# Patient Record
Sex: FEMALE | Race: WHITE | NOT HISPANIC OR LATINO | Employment: FULL TIME | URBAN - METROPOLITAN AREA
[De-identification: names, ages, dates, MRNs, and addresses within clinical notes are randomized per-mention and may not be internally consistent; named-entity substitution may affect disease eponyms.]

---

## 2017-01-04 ENCOUNTER — OFFICE VISIT (OUTPATIENT)
Dept: FAMILY MEDICINE | Facility: CLINIC | Age: 42
End: 2017-01-04
Payer: COMMERCIAL

## 2017-01-04 VITALS
SYSTOLIC BLOOD PRESSURE: 126 MMHG | BODY MASS INDEX: 22.76 KG/M2 | WEIGHT: 145 LBS | HEIGHT: 67 IN | HEART RATE: 80 BPM | TEMPERATURE: 97.6 F | DIASTOLIC BLOOD PRESSURE: 80 MMHG

## 2017-01-04 DIAGNOSIS — R63.4 LOSS OF WEIGHT: ICD-10-CM

## 2017-01-04 DIAGNOSIS — Z00.01 ENCOUNTER FOR GENERAL ADULT MEDICAL EXAMINATION WITH ABNORMAL FINDINGS: Primary | ICD-10-CM

## 2017-01-04 DIAGNOSIS — N95.1 SYMPTOMATIC MENOPAUSAL OR FEMALE CLIMACTERIC STATES: ICD-10-CM

## 2017-01-04 LAB
CHOLEST SERPL-MCNC: 202 MG/DL
GLUCOSE SERPL-MCNC: 87 MG/DL (ref 70–99)
HDLC SERPL-MCNC: 74 MG/DL
LDLC SERPL CALC-MCNC: 111 MG/DL
NONHDLC SERPL-MCNC: 128 MG/DL
TRIGL SERPL-MCNC: 85 MG/DL
TSH SERPL DL<=0.005 MIU/L-ACNC: 0.94 MU/L (ref 0.4–4)

## 2017-01-04 PROCEDURE — 99000 SPECIMEN HANDLING OFFICE-LAB: CPT | Performed by: NURSE PRACTITIONER

## 2017-01-04 PROCEDURE — 87624 HPV HI-RISK TYP POOLED RSLT: CPT | Mod: 90 | Performed by: NURSE PRACTITIONER

## 2017-01-04 PROCEDURE — 84443 ASSAY THYROID STIM HORMONE: CPT | Mod: 90 | Performed by: NURSE PRACTITIONER

## 2017-01-04 PROCEDURE — 82947 ASSAY GLUCOSE BLOOD QUANT: CPT | Mod: 90 | Performed by: NURSE PRACTITIONER

## 2017-01-04 PROCEDURE — G0145 SCR C/V CYTO,THINLAYER,RESCR: HCPCS | Mod: 90 | Performed by: NURSE PRACTITIONER

## 2017-01-04 PROCEDURE — 99396 PREV VISIT EST AGE 40-64: CPT | Performed by: NURSE PRACTITIONER

## 2017-01-04 PROCEDURE — 80061 LIPID PANEL: CPT | Mod: 90 | Performed by: NURSE PRACTITIONER

## 2017-01-04 PROCEDURE — 36415 COLL VENOUS BLD VENIPUNCTURE: CPT | Performed by: NURSE PRACTITIONER

## 2017-01-04 RX ORDER — DESOGESTREL AND ETHINYL ESTRADIOL 0.15-0.03
1 KIT ORAL DAILY
Qty: 84 TABLET | Refills: 4 | Status: SHIPPED | OUTPATIENT
Start: 2017-01-04 | End: 2017-06-19

## 2017-01-04 NOTE — MR AVS SNAPSHOT
After Visit Summary   1/4/2017    Nilam Orozco    MRN: [id number]           Patient Information     Date Of Birth          1975        Visit Information        Provider Department      1/4/2017 8:00 AM Rashida Talley APRN [facility]        Today's Diagnoses     Encounter for general adult medical examination with abnormal findings    -  1     Loss of weight           Care Instructions      Preventive Health Recommendations  Female Ages 40 to 49    Yearly exam:     See your health care provider every year in order to  1. Review health changes.   2. Discuss preventive care.    3. Review your medicines if your doctor prescribed any.      Get a Pap test every three years (unless you have an abnormal result and your provider advises testing more often).      If you get Pap tests with HPV test, you only need to test every 5 years, unless you have an abnormal result. You do not need a Pap test if your uterus was removed (hysterectomy) and you have not had cancer.      You should be tested each year for STDs (sexually transmitted diseases), if you're at risk.       Ask your doctor if you should have a mammogram.      Have a colonoscopy (test for colon cancer) if someone in your family has had colon cancer or polyps before age 50.       Have a cholesterol test every 5 years.       Have a diabetes test (fasting glucose) after age 45. If you are at risk for diabetes, you should have this test every 3 years.    Shots: Get a flu shot each year. Get a tetanus shot every 10 years.     Nutrition:     Eat at least 5 servings of fruits and vegetables each day.    Eat whole-grain bread, whole-wheat pasta and brown rice instead of white grains and rice.    Talk to your provider about Calcium and Vitamin D.     Lifestyle    Exercise at least 150 minutes a week (an average of 30 minutes a day, 5 days a week). This will help you control your weight and prevent disease.    Limit alcohol to one  "drink per day.    No smoking.     Wear sunscreen to prevent skin cancer.    See your dentist every six months for an exam and cleaning.        Follow-ups after your visit        Who to contact     If you have questions or need follow up information about today's clinic visit or your schedule please contact [facility] directly at [phone].  Normal or non-critical lab and imaging results will be communicated to you by MyChart, letter or phone within 4 business days after the clinic has received the results. If you do not hear from us within 7 days, please contact the clinic through CoverMyMedshart or phone. If you have a critical or abnormal lab result, we will notify you by phone as soon as possible.  Submit refill requests through i[employer] or call your pharmacy and they will forward the refill request to us. Please allow 3 business days for your refill to be completed.          Additional Information About Your Visit        CoverMyMedshart Information     i[employer] lets you send messages to your doctor, view your test results, renew your prescriptions, schedule appointments and more. To sign up, go to [email or web address] . Click on \"Log in\" on the left side of the screen, which will take you to the Welcome page. Then click on \"Sign up Now\" on the right side of the page.     You will be asked to enter the access code listed below, as well as some personal information. Please follow the directions to create your username and password.     Your access code is: [email or web address]  Expires: 2017  9:19 AM     Your access code will  in 90 days. If you need help or a new code, please call your [place] clinic or [phone].        Care EveryWhere ID     This is your Care EveryWhere ID. This could be used by other organizations to access your [place] medical records  [id number]        Your Vitals Were     Pulse Temperature Height BMI (Body Mass Index) Last Period       80 97.6  F (36.4  C) (Tympanic) 5' 7\" " (1.702 m) 22.71 kg/m2 06/01/2016        Blood Pressure from Last 3 Encounters:   01/04/17 126/80   08/06/14 120/80    Weight from Last 3 Encounters:   01/04/17 145 lb (65.772 kg)   08/06/14 145 lb 3.2 oz (65.862 kg)              We Performed the Following     Glucose     HPV High Risk Types DNA Cervical     Lipid panel reflex to direct LDL     Pap imaged thin layer screen with HPV - recommended age 30 - 65 years (select HPV order below)     TSH with free T4 reflex        Primary Care Provider    None Specified       No primary provider on file.        Thank you!     Thank you for choosing [facility]  for your care. Our goal is always to provide you with excellent care. Hearing back from our patients is one way we can continue to improve our services. Please take a few minutes to complete the written survey that you may receive in the mail after your visit with us. Thank you!             Your Updated Medication List - Protect others around you: Learn how to safely use, store and throw away your medicines at www.disposemymeds.org.      Notice  As of 1/4/2017  9:19 AM    You have not been prescribed any medications.

## 2017-01-04 NOTE — PROGRESS NOTES
.     SUBJECTIVE:     CC: Nilam Orozco is an 41 year old woman who presents for preventive health visit.     Healthy Habits:    Do you get at least three servings of calcium containing foods daily (dairy, green leafy vegetables, etc.)? yes    Amount of exercise or daily activities, outside of work: on feet all day with working    Problems taking medications regularly not applicable    Medication side effects: No    Have you had an eye exam in the past two years? no    Do you see a dentist twice per year? yes    Do you have sleep apnea, excessive snoring or daytime drowsiness?no            Today's PHQ-2 Score: No flowsheet data found.    Abuse: Current or Past(Physical, Sexual or Emotional)- No  Do you feel safe in your environment - Yes    Social History   Substance Use Topics     Smoking status: Never Smoker      Smokeless tobacco: Not on file     Alcohol Use: 0.0 oz/week     0 Standard drinks or equivalent per week     The patient does not drink >3 drinks per day nor >7 drinks per week.    Recent Labs   Lab Test 12   CHOL  180   HDL  48   LDL  104   TRIG  140       Reviewed orders with patient.  Reviewed health maintenance and updated orders accordingly - Yes    Mammo Decision Support:  Patient under age 50, mutual decision reflected in health maintenance.      Pertinent mammograms are reviewed under the imaging tab.  History of abnormal Pap smear: NO - age 30-65 PAP every 5 years with negative HPV co-testing recommended  All Histories reviewed and updated in Epic.  Past Medical History   Diagnosis Date     Lipoma      multiple on legs      Past Surgical History   Procedure Laterality Date     Surgical pathology exam       multiple     Obstetric History       T0      TAB0   SAB0   E0   M0   L3       # Outcome Date GA Lbr Heam/2nd Weight Sex Delivery Anes PTL Lv   3             2             1                    ROS:  C: POSITIVE for weight loss of approximately 45 pounds  "after starting a new job. His very physical, she is on her feet walking all day long. This weight was lost over the course of about 6 months. She has gained some of it back and has now maintained that weight for the past year   I: NEGATIVE for worrisome rashes, moles or lesions  E: NEGATIVE for vision changes or irritation  ENT: NEGATIVE for ear, mouth and throat problems  R: NEGATIVE for significant cough or SOB  B: NEGATIVE for masses, tenderness or discharge  CV: NEGATIVE for chest pain, palpitations or peripheral edema  GI: NEGATIVE for nausea, abdominal pain, heartburn, or change in bowel habits   female: POSITIVE for infrequent irregular menstrual periods. States she has her menses approximately once every 6 months for the past 3 years  M: NEGATIVE for significant arthralgias or myalgia  N: NEGATIVE for weakness, dizziness or paresthesias  ENDOCRINE: POSITIVE  for hot flashes and diminished libido  P: NEGATIVE for changes in mood or affect    Problem list, Medication list, Allergies, and Medical/Social/Surgical histories reviewed in [facility] and updated as appropriate.  BP Readings from Last 3 Encounters:   01/04/17 126/80   08/06/14 120/80    Wt Readings from Last 3 Encounters:   01/04/17 145 lb (65.772 kg)   08/06/14 145 lb 3.2 oz (65.862 kg)                  OBJECTIVE:     /80 mmHg  Pulse 80  Temp(Src) 97.6  F (36.4  C) (Tympanic)  Ht 5' 7\" (1.702 m)  Wt 145 lb (65.772 kg)  BMI 22.71 kg/m2  LMP 06/01/2016  EXAM:  GENERAL: healthy, alert and no distress  EYES: Eyes grossly normal to inspection, PERRL and conjunctivae and sclerae normal  HENT: ear canals and TM's normal, nose and mouth without ulcers or lesions  NECK: no adenopathy, no asymmetry, masses, or scars and thyroid normal to palpation  RESP: lungs clear to auscultation - no rales, rhonchi or wheezes  BREAST: normal without masses, tenderness or nipple discharge and no palpable axillary masses or adenopathy  CV: regular rate and rhythm, " normal S1 S2, no S3 or S4, no murmur, click or rub, no peripheral edema and peripheral pulses strong  ABDOMEN: soft, nontender, no hepatosplenomegaly, no masses and bowel sounds normal   (female): normal female external genitalia, normal urethral meatus, vaginal mucosa pink, moist, well rugated, and normal cervix/adnexa/uterus without masses or discharge  MS: no gross musculoskeletal defects noted, no edema  SKIN: no suspicious lesions or rashes  NEURO: Normal strength and tone, mentation intact and speech normal  PSYCH: mentation appears normal, affect normal/bright  LYMPH: no cervical, supraclavicular, axillary, or inguinal adenopathy    ASSESSMENT/PLAN:     1. Encounter for general adult medical examination with abnormal findings  Recommend annual well exam with Pap smear and HPV testing every 5 years if negative  - Pap imaged thin layer screen with HPV - recommended age 30 - 65 years (select HPV order below)  - HPV High Risk Types DNA Cervical  - Lipid panel reflex to direct LDL  - Glucose    2. Loss of weight  Possibly related to increased activity and poor eating habits. Will rule out thyroid disorder  - TSH with free T4 reflex    3. Symptomatic menopausal or female climacteric states  Patient is 41, a nonsmoker. Expensive symptoms of menopause. Will put on oral contraceptive for estrogen replacement. Potential side effects reviewed with patient.  - desogestrel-ethinyl estradiol (APRI) 0.15-30 MG-MCG per tablet; Take 1 tablet by mouth daily  Dispense: 84 tablet; Refill: 4    COUNSELING:   Reviewed preventive health counseling, as reflected in patient instructions       Regular exercise       Healthy diet/nutrition       Osteoporosis Prevention/Bone Health       (Ana)menopause management    BP Screening:   Last 3 BP Readings:    BP Readings from Last 3 Encounters:   01/04/17 126/80   08/06/14 120/80       The following was recommended to the patient:  Re-screen BP within a year and recommended lifestyle  "modifications       reports that she has never smoked. She does not have any smokeless tobacco history on file.    Estimated body mass index is 22.71 kg/(m^2) as calculated from the following:    Height as of this encounter: 5' 7\" (1.702 m).    Weight as of this encounter: 145 lb (65.772 kg).       Counseling Resources:  ATP IV Guidelines  Pooled Cohorts Equation Calculator  Breast Cancer Risk Calculator  FRAX Risk Assessment  ICSI Preventive Guidelines  Dietary Guidelines for Americans, 2010  USDA's MyPlate  ASA Prophylaxis  Lung CA Screening    WINDY Suarez CNP  [facility]  "

## 2017-01-04 NOTE — Clinical Note
[address] [id number] [phone]             January 5, 2017    Nilam Orozco  [address] [facility] [address]              Dear Nilam,    blood sugar and TSH are both normal. Cholesterol actually looks quite good, just barely above the ideal. Maintain adequate exercise and nutritious diet, limit fats.  Enclosed is a copy of the results.  It was a pleasure to see you at your last appointment.    If you have any questions or concerns, please call myself or my nurse at [phone].      Sincerely,      Rashida Talley CNP / care team

## 2017-01-04 NOTE — Clinical Note
[address] [address]  [phone]      January 11, 2017    Nilam Orozco  [address] [facility] [address]    Dear Nilam,  We are happy to inform you that your PAP smear result from 1/4/17 is normal.  We are now able to do a follow up test on PAP smears. The DNA test is for HPV (Human Papilloma Virus). Cervical cancer is closely linked with certain types of HPV. Your result showed no evidence of high risk HPV.  Therefore we recommend you return in 5 years for your next pap smear.  You will still need to return to the clinic every year for an annual exam and other preventive tests.  Please contact the clinic with any questions.  Sincerely,  WINDY Suarez CNP/rlm

## 2017-01-04 NOTE — NURSING NOTE
"Chief Complaint   Patient presents with     Physical       Initial /80 mmHg  Pulse 80  Temp(Src) 97.6  F (36.4  C) (Tympanic)  Ht 5' 7\" (1.702 m)  Wt 145 lb (65.772 kg)  BMI 22.71 kg/m2  LMP 06/01/2016 Estimated body mass index is 22.71 kg/(m^2) as calculated from the following:    Height as of this encounter: 5' 7\" (1.702 m).    Weight as of this encounter: 145 lb (65.772 kg).  BP completed using cuff size: regular  "

## 2017-01-06 LAB
COPATH REPORT: NORMAL
PAP: NORMAL

## 2017-01-09 LAB
FINAL DIAGNOSIS: NORMAL
HPV HR 12 DNA CVX QL NAA+PROBE: NEGATIVE
HPV16 DNA SPEC QL NAA+PROBE: NEGATIVE
HPV18 DNA SPEC QL NAA+PROBE: NEGATIVE
SPECIMEN DESCRIPTION: NORMAL

## 2017-06-19 ENCOUNTER — OFFICE VISIT (OUTPATIENT)
Dept: FAMILY MEDICINE | Facility: OTHER | Age: 42
End: 2017-06-19
Payer: COMMERCIAL

## 2017-06-19 VITALS
RESPIRATION RATE: 12 BRPM | DIASTOLIC BLOOD PRESSURE: 60 MMHG | HEIGHT: 67 IN | BODY MASS INDEX: 23.86 KG/M2 | TEMPERATURE: 98.2 F | WEIGHT: 152 LBS | SYSTOLIC BLOOD PRESSURE: 108 MMHG | HEART RATE: 82 BPM

## 2017-06-19 DIAGNOSIS — S46.811A STRAIN OF TRAPEZIUS MUSCLE, RIGHT, INITIAL ENCOUNTER: Primary | ICD-10-CM

## 2017-06-19 DIAGNOSIS — M54.2 CERVICALGIA: ICD-10-CM

## 2017-06-19 PROCEDURE — 99213 OFFICE O/P EST LOW 20 MIN: CPT | Performed by: FAMILY MEDICINE

## 2017-06-19 ASSESSMENT — PAIN SCALES - GENERAL: PAINLEVEL: EXTREME PAIN (8)

## 2017-06-19 NOTE — MR AVS SNAPSHOT
After Visit Summary   6/19/2017    Nilam Orozco    MRN: [id number]           Patient Information     Date Of Birth          1975        Visit Information        Provider Department      6/19/2017 5:40 PM Matthew Spencer MD [facility]        Today's Diagnoses     Strain of trapezius muscle, right, initial encounter    -  1    Cervicalgia          Care Instructions      Neck Problems: Relieving Your Symptoms  The first goal of treatment is to relieve your symptoms. Your healthcare provider may recommend self-care treatments. These include resting, applying ice and heat, taking medicine, and doing exercises. Your healthcare provider may also recommend that you see a physical therapist who can teach you ways to care for and strengthen your neck.     Heat relaxes sore muscles and helps relieve spasms.   Self-care treatments  Pain can end quickly or last awhile. Either way, you ll want relief as soon as possible. Your healthcare provider can tell you which treatments to do at home to help relieve your pain.    Lying down for a short time takes pressure from the head off the neck.    Ice and heat can help reduce pain. To bring down swelling, rest an ice pack wrapped in a thin towel on your neck for 10 to 15 minutes. To relax sore muscles, apply a warm, wet towel to the area. Or you can take a warm bath or shower.    Over-the-counter medicines, such as ibuprofen, naproxen, and aspirin, can help reduce pain and swelling. Acetaminophen can help relieve pain. Use these only as directed.    Exercises can relax muscles and ease stiffness. To prepare, drape a warm, wet towel around your neck and shoulders for 5 minutes. Remove the towel. Then do any exercises recommended to you by your healthcare provider.  Physical therapy  If self-care treatments aren t helping relieve neck pain, your healthcare provider may suggest physical therapy. Physical therapy is done by a specialist trained to  treat injuries. Your physical therapist (PT) will teach you how to strengthen muscles, improve the spine s alignment, and help you move properly. Treatment methods used in physical therapy may include:    Heat. A special heating pad called a neck pack may be applied to your neck.    Exercises. Your PT will teach you exercises to help strengthen your neck and improve its range of motion.    Joint mobilization. The PT gently moves your vertebrae to help restore motion in your neck joints and reduce neck pain.    Soft tissue mobilization. The PT massages and stretches the muscles in your neck and shoulders.    Electrical stimulation. Electrical impulses are sent into your neck. This helps reduce soreness and inflammation.    Education in body mechanics. The PT shows you ways to position and move your body that protect the neck.  Other treatments  If physical therapy doesn t relieve your neck pain, your healthcare provider may suggest other treatments. For example, medicines or injections can help relieve pain and swelling. In some cases, surgery may be needed to treat neck problems.  Date Last Reviewed: 8/23/2015 2000-2017 [employer]. [address] [place] [address]. All rights reserved. This information is not intended as a substitute for professional medical care. Always follow your healthcare professional's instructions.        Understanding Neck Problems       If you suffer from neck pain, you re not alone. Many people have neck pain at some point in their lives. Problems such as poor posture, injury, and wear and tear can lead to neck pain. Your healthcare provider will work with you to find the treatment that s best for your neck.  Types of neck problems    The following problems can cause pain or injury in your neck:    Strains and sprains: Strains (stretched or torn muscles) and sprains (stretched or torn ligaments) can cause neck pain. Strains and sprains can occur during an  accident, or when you overuse your neck through repetitive motion. They can also cause your muscles and ligaments to become inflamed (swollen and painful).    Whiplash and other injuries: Whiplash can result when an impact throws your head, forcing your neck too far forward, then too far backward. When combined, the two motions can cause a painful injury to different parts of your neck, such as muscles, ligaments, or joints. The most common cause of whiplash is a car accident. But it can also happen during a fall or sports injury.    Weakened disks: A simple action, such as a sneeze or a cough, can cause one of your disks to bulge or rupture (herniate). A herniated disk can put pressure on your nerve and cause pain. Over time, disks can also thin out (degenerate). Flattened disks don t cushion vertebrae well and can cause vertebrae to rub together. Also, there is less space for the nerves. This can pinch nerves and cause pain.    Weakened joints: Aging and injury can cause joints to slowly degenerate. Thinned joints can also cause vertebrae to rub together. This can cause abnormal growths of bone (bone spurs) to form on vertebrae. Bone spurs put pressure on nerves, causing pain.  Common symptoms  If you have a neck problem, you may have one or more of the following symptoms:    Muscle tension and spasm: You may not be able to move your neck, arms, or shoulders comfortably if you have muscle tension or stiffness in your neck. If your symptoms aren t relieved, you may experience muscle spasms, or knots of contracted tissue (trigger points) in areas of your neck and shoulders.    Aches and pains: Dull aches in your head or neck, sharp pains, and swelling of the soft tissue of your neck and shoulders are common symptoms. If there s pressure on the nerves in your neck, you may feel pain in your arms or hands.    Numbness or weakness: If you injure the nerves in your neck, you may have numbness, tingling, or weakness in  your shoulders, arms, or hands. These symptoms arise when disks or bone spurs press on the nerves in your neck. Severe disease can also affect your legs.  Date Last Reviewed: 8/23/2015 2000-2017 [email or web address]. [address] [place] [address]. All rights reserved. This information is not intended as a substitute for professional medical care. Always follow your healthcare professional's instructions.        Know Your Neck: The Cervical Spine  By learning about the parts of the neck, you can better understand your neck problem. The bones of the neck are called cervical vertebrae, commonly identified as C1 through C7. Together, they form a bony column called the spine. Vertebrae also protect the spinal cord, a pathway for messages to reach the brain. Surrounding the spine are soft tissues such as muscles, tendons, and nerves.          Flexibility Is Key  For the neck to function normally, it has to be flexible enough to move without discomfort. A healthy neck can move easily in six different directions.     Flexion and extension move the head forward and backward.      Rotation turns the head from left to right, with the chin over the shoulders.      Lateral bending moves the head from side to side.     Date Last Reviewed: 10/1/2015    4682-3203 [email or web address]. [address] [place] [address]. All rights reserved. This information is not intended as a substitute for professional medical care. Always follow your healthcare professional's instructions.                Follow-ups after your visit        Additional Services     PHYSICAL THERAPY REFERRAL       *This therapy referral will be filtered to a centralized scheduling office at [facility] and the patient will receive a call to schedule an appointment at a [place] location most convenient for them. *     [facility] provides Physical Therapy evaluation and treatment and many  "specialty services across the [facility] system.  If requesting a specialty program, please choose from the list below.    If you have not heard from the scheduling office within 2 business days, please call [phone] for all locations, with the exception of [place], please call [phone].  Treatment: Evaluation & Treatment  Special Instructions/Modalities:   Special Programs:     Please be aware that coverage of these services is subject to the terms and limitations of your health insurance plan.  Call member services at your health plan with any benefit or coverage questions.      **Note to Provider:  If you are referring outside of [facility] for the therapy appointment, please list the name of the location in the \"special instructions\" above, print the referral and give to the patient to schedule the appointment.                  Who to contact     If you have questions or need follow up information about today's clinic visit or your schedule please contact [facility] directly at [phone].  Normal or non-critical lab and imaging results will be communicated to you by MyChart, letter or phone within 4 business days after the clinic has received the results. If you do not hear from us within 7 days, please contact the clinic through [employer] or phone. If you have a critical or abnormal lab result, we will notify you by phone as soon as possible.  Submit refill requests through [employer] or call your pharmacy and they will forward the refill request to us. Please allow 3 business days for your refill to be completed.          Additional Information About Your Visit        [employer] Information     [employer] lets you send messages to your doctor, view your test results, renew your prescriptions, schedule appointments and more. To sign up, go to [email or web address] . Click on \"Log in\" on the left side of the screen, which will take you to the Welcome page. Then click on \"Sign up Now\" on the right " "side of the page.     You will be asked to enter the access code listed below, as well as some personal information. Please follow the directions to create your username and password.     Your access code is: [email or web address]  Expires: 2017  6:23 PM     Your access code will  in 90 days. If you need help or a new code, please call your [place] clinic or [phone].        Care EveryWhere ID     This is your Care EveryWhere ID. This could be used by other organizations to access your [facility] medical records  [id number]        Your Vitals Were     Pulse Temperature Respirations Height BMI (Body Mass Index)       82 98.2  F (36.8  C) (Oral) 12 5' 7\" (1.702 m) 23.81 kg/m2        Blood Pressure from Last 3 Encounters:   17 108/60   17 126/80   14 120/80    Weight from Last 3 Encounters:   17 152 lb (68.9 kg)   17 145 lb (65.8 kg)   14 145 lb 3.2 oz (65.9 kg)              We Performed the Following     PHYSICAL THERAPY REFERRAL        Primary Care Provider    None Specified       No primary provider on file.        Thank you!     Thank you for choosing [facility]  for your care. Our goal is always to provide you with excellent care. Hearing back from our patients is one way we can continue to improve our services. Please take a few minutes to complete the written survey that you may receive in the mail after your visit with us. Thank you!             Your Updated Medication List - Protect others around you: Learn how to safely use, store and throw away your medicines at www.disposemymeds.org.      Notice  As of 2017  6:23 PM    You have not been prescribed any medications.      "

## 2017-06-19 NOTE — PROGRESS NOTES
"  SUBJECTIVE:                                                    Nilam Orozco is a 41 year old female who presents to clinic today for the following health issues:    SUBJECTIVE:  Joint Pain     Onset: about 6 months    Description:   Location: upper back-right side  Character: Dull ache and throbbing    Intensity: moderate, severe    Progression of Symptoms: worse, same    Accompanying Signs & Symptoms:  Other symptoms: radiation of pain to upper arm, numbness and tingling when pain is really bad   History:   Previous similar pain: no       Precipitating factors:   Trauma or overuse: no     Alleviating factors:  Improved by: nothing       Therapies Tried and outcome: massage, ibuprofen, ice, heat, acupuncture, chiropractor      /60  Pulse 82  Temp 98.2  F (36.8  C) (Oral)  Resp 12  Ht 5' 7\" (1.702 m)  Wt 152 lb (68.9 kg)  BMI 23.81 kg/m2    PROBLEMS TO ADD ON...    Problem list and histories reviewed & adjusted, as indicated.  Additional history: as documented    No current outpatient prescriptions on file.     Allergies   Allergen Reactions     Codeine        Reviewed and updated as needed this visit by clinical staff       Reviewed and updated as needed this visit by Provider  OBJECTIVE:  Vital signs as noted above.  Appearance: in no apparent distress and well developed and well nourished.  Shoulder exam with in normal range  ipsilateral elbow, wrist and hand exam is normal, contralateral shoulder exam is normal.    Neck exam: tenderness over lower cervical spine and nuchal area, tenderness over trapezial muscles, reduced painful C-spine range of motion, normal neurological exam of arms; normal DTR's, motor, sensory exam.    ASSESSMENT:      ICD-10-CM    1. Strain of trapezius muscle, right, initial encounter S46.811A PHYSICAL THERAPY REFERRAL   2. Cervicalgia M54.2 PHYSICAL THERAPY REFERRAL     PLAN:  NSAID, ice suggested  activity modification  referral to physical therapy  See orders in " [facility].  The patient understood the rational for the diagnosis and treatment plan. All questions were answered to best of my ability and the patient's satisfaction.  Hydrate well, tylenol as needed.  Risks, benefits and alternatives of treatments discussed. Plan agreed on.   Will call, return to clinic, or go to ED if worsening or symptoms not improving as discussed. See patient instructions.       Patient Instructions       Neck Problems: Relieving Your Symptoms  The first goal of treatment is to relieve your symptoms. Your healthcare provider may recommend self-care treatments. These include resting, applying ice and heat, taking medicine, and doing exercises. Your healthcare provider may also recommend that you see a physical therapist who can teach you ways to care for and strengthen your neck.     Heat relaxes sore muscles and helps relieve spasms.   Self-care treatments  Pain can end quickly or last awhile. Either way, you ll want relief as soon as possible. Your healthcare provider can tell you which treatments to do at home to help relieve your pain.    Lying down for a short time takes pressure from the head off the neck.    Ice and heat can help reduce pain. To bring down swelling, rest an ice pack wrapped in a thin towel on your neck for 10 to 15 minutes. To relax sore muscles, apply a warm, wet towel to the area. Or you can take a warm bath or shower.    Over-the-counter medicines, such as ibuprofen, naproxen, and aspirin, can help reduce pain and swelling. Acetaminophen can help relieve pain. Use these only as directed.    Exercises can relax muscles and ease stiffness. To prepare, drape a warm, wet towel around your neck and shoulders for 5 minutes. Remove the towel. Then do any exercises recommended to you by your healthcare provider.  Physical therapy  If self-care treatments aren t helping relieve neck pain, your healthcare provider may suggest physical therapy. Physical therapy is done by a  specialist trained to treat injuries. Your physical therapist (PT) will teach you how to strengthen muscles, improve the spine s alignment, and help you move properly. Treatment methods used in physical therapy may include:    Heat. A special heating pad called a neck pack may be applied to your neck.    Exercises. Your PT will teach you exercises to help strengthen your neck and improve its range of motion.    Joint mobilization. The PT gently moves your vertebrae to help restore motion in your neck joints and reduce neck pain.    Soft tissue mobilization. The PT massages and stretches the muscles in your neck and shoulders.    Electrical stimulation. Electrical impulses are sent into your neck. This helps reduce soreness and inflammation.    Education in body mechanics. The PT shows you ways to position and move your body that protect the neck.  Other treatments  If physical therapy doesn t relieve your neck pain, your healthcare provider may suggest other treatments. For example, medicines or injections can help relieve pain and swelling. In some cases, surgery may be needed to treat neck problems.  Date Last Reviewed: 8/23/2015 2000-2017 [employer]. [address] [place] [address]. All rights reserved. This information is not intended as a substitute for professional medical care. Always follow your healthcare professional's instructions.        Understanding Neck Problems       If you suffer from neck pain, you re not alone. Many people have neck pain at some point in their lives. Problems such as poor posture, injury, and wear and tear can lead to neck pain. Your healthcare provider will work with you to find the treatment that s best for your neck.  Types of neck problems    The following problems can cause pain or injury in your neck:    Strains and sprains: Strains (stretched or torn muscles) and sprains (stretched or torn ligaments) can cause neck pain. Strains and sprains can  occur during an accident, or when you overuse your neck through repetitive motion. They can also cause your muscles and ligaments to become inflamed (swollen and painful).    Whiplash and other injuries: Whiplash can result when an impact throws your head, forcing your neck too far forward, then too far backward. When combined, the two motions can cause a painful injury to different parts of your neck, such as muscles, ligaments, or joints. The most common cause of whiplash is a car accident. But it can also happen during a fall or sports injury.    Weakened disks: A simple action, such as a sneeze or a cough, can cause one of your disks to bulge or rupture (herniate). A herniated disk can put pressure on your nerve and cause pain. Over time, disks can also thin out (degenerate). Flattened disks don t cushion vertebrae well and can cause vertebrae to rub together. Also, there is less space for the nerves. This can pinch nerves and cause pain.    Weakened joints: Aging and injury can cause joints to slowly degenerate. Thinned joints can also cause vertebrae to rub together. This can cause abnormal growths of bone (bone spurs) to form on vertebrae. Bone spurs put pressure on nerves, causing pain.  Common symptoms  If you have a neck problem, you may have one or more of the following symptoms:    Muscle tension and spasm: You may not be able to move your neck, arms, or shoulders comfortably if you have muscle tension or stiffness in your neck. If your symptoms aren t relieved, you may experience muscle spasms, or knots of contracted tissue (trigger points) in areas of your neck and shoulders.    Aches and pains: Dull aches in your head or neck, sharp pains, and swelling of the soft tissue of your neck and shoulders are common symptoms. If there s pressure on the nerves in your neck, you may feel pain in your arms or hands.    Numbness or weakness: If you injure the nerves in your neck, you may have numbness, tingling,  or weakness in your shoulders, arms, or hands. These symptoms arise when disks or bone spurs press on the nerves in your neck. Severe disease can also affect your legs.  Date Last Reviewed: 8/23/2015 2000-2017 [employer]. [address] [place] [address]. All rights reserved. This information is not intended as a substitute for professional medical care. Always follow your healthcare professional's instructions.        Know Your Neck: The Cervical Spine  By learning about the parts of the neck, you can better understand your neck problem. The bones of the neck are called cervical vertebrae, commonly identified as C1 through C7. Together, they form a bony column called the spine. Vertebrae also protect the spinal cord, a pathway for messages to reach the brain. Surrounding the spine are soft tissues such as muscles, tendons, and nerves.          Flexibility Is Key  For the neck to function normally, it has to be flexible enough to move without discomfort. A healthy neck can move easily in six different directions.     Flexion and extension move the head forward and backward.      Rotation turns the head from left to right, with the chin over the shoulders.      Lateral bending moves the head from side to side.     Date Last Reviewed: 10/1/2015    [id number] [employer]. [address] [place] [address]. All rights reserved. This information is not intended as a substitute for professional medical care. Always follow your healthcare professional's instructions.

## 2017-06-19 NOTE — NURSING NOTE
"Chief Complaint   Patient presents with     Musculoskeletal Problem       Initial /60  Pulse 82  Temp 98.2  F (36.8  C) (Oral)  Resp 12  Ht 5' 7\" (1.702 m)  Wt 152 lb (68.9 kg)  BMI 23.81 kg/m2 Estimated body mass index is 23.81 kg/(m^2) as calculated from the following:    Height as of this encounter: 5' 7\" (1.702 m).    Weight as of this encounter: 152 lb (68.9 kg).  Medication Reconciliation: complete     Laureen Page CMA (AAMA)      "

## 2017-06-19 NOTE — PATIENT INSTRUCTIONS
Neck Problems: Relieving Your Symptoms  The first goal of treatment is to relieve your symptoms. Your healthcare provider may recommend self-care treatments. These include resting, applying ice and heat, taking medicine, and doing exercises. Your healthcare provider may also recommend that you see a physical therapist who can teach you ways to care for and strengthen your neck.     Heat relaxes sore muscles and helps relieve spasms.   Self-care treatments  Pain can end quickly or last awhile. Either way, you ll want relief as soon as possible. Your healthcare provider can tell you which treatments to do at home to help relieve your pain.    Lying down for a short time takes pressure from the head off the neck.    Ice and heat can help reduce pain. To bring down swelling, rest an ice pack wrapped in a thin towel on your neck for 10 to 15 minutes. To relax sore muscles, apply a warm, wet towel to the area. Or you can take a warm bath or shower.    Over-the-counter medicines, such as ibuprofen, naproxen, and aspirin, can help reduce pain and swelling. Acetaminophen can help relieve pain. Use these only as directed.    Exercises can relax muscles and ease stiffness. To prepare, drape a warm, wet towel around your neck and shoulders for 5 minutes. Remove the towel. Then do any exercises recommended to you by your healthcare provider.  Physical therapy  If self-care treatments aren t helping relieve neck pain, your healthcare provider may suggest physical therapy. Physical therapy is done by a specialist trained to treat injuries. Your physical therapist (PT) will teach you how to strengthen muscles, improve the spine s alignment, and help you move properly. Treatment methods used in physical therapy may include:    Heat. A special heating pad called a neck pack may be applied to your neck.    Exercises. Your PT will teach you exercises to help strengthen your neck and improve its range of motion.    Joint  mobilization. The PT gently moves your vertebrae to help restore motion in your neck joints and reduce neck pain.    Soft tissue mobilization. The PT massages and stretches the muscles in your neck and shoulders.    Electrical stimulation. Electrical impulses are sent into your neck. This helps reduce soreness and inflammation.    Education in body mechanics. The PT shows you ways to position and move your body that protect the neck.  Other treatments  If physical therapy doesn t relieve your neck pain, your healthcare provider may suggest other treatments. For example, medicines or injections can help relieve pain and swelling. In some cases, surgery may be needed to treat neck problems.  Date Last Reviewed: 8/23/2015 2000-2017 [employer]. [address] [place] [address], [place] [address]. All rights reserved. This information is not intended as a substitute for professional medical care. Always follow your healthcare professional's instructions.        Understanding Neck Problems       If you suffer from neck pain, you re not alone. Many people have neck pain at some point in their lives. Problems such as poor posture, injury, and wear and tear can lead to neck pain. Your healthcare provider will work with you to find the treatment that s best for your neck.  Types of neck problems    The following problems can cause pain or injury in your neck:    Strains and sprains: Strains (stretched or torn muscles) and sprains (stretched or torn ligaments) can cause neck pain. Strains and sprains can occur during an accident, or when you overuse your neck through repetitive motion. They can also cause your muscles and ligaments to become inflamed (swollen and painful).    Whiplash and other injuries: Whiplash can result when an impact throws your head, forcing your neck too far forward, then too far backward. When combined, the two motions can cause a painful injury to different parts of your neck, such as muscles,  ligaments, or joints. The most common cause of whiplash is a car accident. But it can also happen during a fall or sports injury.    Weakened disks: A simple action, such as a sneeze or a cough, can cause one of your disks to bulge or rupture (herniate). A herniated disk can put pressure on your nerve and cause pain. Over time, disks can also thin out (degenerate). Flattened disks don t cushion vertebrae well and can cause vertebrae to rub together. Also, there is less space for the nerves. This can pinch nerves and cause pain.    Weakened joints: Aging and injury can cause joints to slowly degenerate. Thinned joints can also cause vertebrae to rub together. This can cause abnormal growths of bone (bone spurs) to form on vertebrae. Bone spurs put pressure on nerves, causing pain.  Common symptoms  If you have a neck problem, you may have one or more of the following symptoms:    Muscle tension and spasm: You may not be able to move your neck, arms, or shoulders comfortably if you have muscle tension or stiffness in your neck. If your symptoms aren t relieved, you may experience muscle spasms, or knots of contracted tissue (trigger points) in areas of your neck and shoulders.    Aches and pains: Dull aches in your head or neck, sharp pains, and swelling of the soft tissue of your neck and shoulders are common symptoms. If there s pressure on the nerves in your neck, you may feel pain in your arms or hands.    Numbness or weakness: If you injure the nerves in your neck, you may have numbness, tingling, or weakness in your shoulders, arms, or hands. These symptoms arise when disks or bone spurs press on the nerves in your neck. Severe disease can also affect your legs.  Date Last Reviewed: 8/23/2015 2000-2017 [employer]. [address] [place] [address], [place] [address]. All rights reserved. This information is not intended as a substitute for professional medical care. Always follow your healthcare  professional's instructions.        Know Your Neck: The Cervical Spine  By learning about the parts of the neck, you can better understand your neck problem. The bones of the neck are called cervical vertebrae, commonly identified as C1 through C7. Together, they form a bony column called the spine. Vertebrae also protect the spinal cord, a pathway for messages to reach the brain. Surrounding the spine are soft tissues such as muscles, tendons, and nerves.          Flexibility Is Key  For the neck to function normally, it has to be flexible enough to move without discomfort. A healthy neck can move easily in six different directions.     Flexion and extension move the head forward and backward.      Rotation turns the head from left to right, with the chin over the shoulders.      Lateral bending moves the head from side to side.     Date Last Reviewed: 10/1/2015    5437-3825 [employer]. [address] [place] [address], [place] [address]. All rights reserved. This information is not intended as a substitute for professional medical care. Always follow your healthcare professional's instructions.

## 2017-11-18 ENCOUNTER — HOSPITAL ENCOUNTER (EMERGENCY)
Facility: CLINIC | Age: 42
Discharge: HOME | End: 2017-11-18
Attending: FAMILY MEDICINE | Admitting: FAMILY MEDICINE
Payer: OTHER MISCELLANEOUS

## 2017-11-18 VITALS
RESPIRATION RATE: 14 BRPM | BODY MASS INDEX: 23.02 KG/M2 | WEIGHT: 147 LBS | OXYGEN SATURATION: 99 % | TEMPERATURE: 98.6 F | DIASTOLIC BLOOD PRESSURE: 91 MMHG | HEART RATE: 70 BPM | SYSTOLIC BLOOD PRESSURE: 132 MMHG

## 2017-11-18 DIAGNOSIS — S29.011A CHEST WALL MUSCLE STRAIN, INITIAL ENCOUNTER: ICD-10-CM
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
## 2017-11-18 PROCEDURE — 99282 EMERGENCY DEPT VISIT SF MDM: CPT | Mod: Z6 | Performed by: FAMILY MEDICINE

## 2017-11-18 PROCEDURE — 99282 EMERGENCY DEPT VISIT SF MDM: CPT | Performed by: FAMILY MEDICINE

## 2017-11-18 NOTE — DISCHARGE INSTRUCTIONS
Thank you for giving us the opportunity to see you. The impression is that you have left sided chest wall muscle strain.    Apply ice for the next 2 days, and then add heat.  Continue with over-the-counter topical creams for pain control.    Take ibuprofen up to 600 mg 4 times a day with food for 3-7 days.  Add Tylenol for breakthrough pain.    Please see and follow the work restrictions    Follow-up in the clinic in 7-10 days for recheck.    After discharge, please closely monitor for any new or worsening symptoms. Return to the Emergency Department at any time if your symptoms worsen.

## 2017-11-18 NOTE — LETTER
[facility] EMERGENCY DEPARTMENT  [address] [place] [address]  [phone]      2017    Nilam Orozco  [address] [address]  [phone] (home)     : 1975      To Whom it may concern:    Nilam Orozco was seen in our Emergency Department today, 2017 for an injury that was reported to be work related.  Please refer to the medical provider treatment form    \Patient should not perform any activities that require twisting, bending, over the shoulder activity, pushing or pulling and no weights of greater than 10 pounds.    After returning to work the following restrictions apply for 10 days:         Sincerely,        Julian Schwartz MD  Staff Physician  [facility] Emergency Department

## 2017-11-18 NOTE — ED AVS SNAPSHOT
[facility] Emergency Department    [address] [place] [address]    Phone:  [phone]    Fax:  [phone]                                       Nilam Orozco   MRN: [id number]    Department:  [facility] Emergency Department   Date of Visit:  11/18/2017           After Visit Summary Signature Page     I have received my discharge instructions, and my questions have been answered. I have discussed any challenges I see with this plan with the nurse or doctor.    ..........................................................................................................................................  Patient/Patient Representative Signature      ..........................................................................................................................................  Patient Representative Print Name and Relationship to Patient    ..................................................               ................................................  Date                                            Time    ..........................................................................................................................................  Reviewed by Signature/Title    ...................................................              ..............................................  Date                                                            Time

## 2017-11-18 NOTE — ED AVS SNAPSHOT
[facility] Emergency Department    [address] [place] [address]    Phone:  [phone]    Fax:  [phone]                                       Nilam Orozco   MRN: [id number]    Department:  [facility] Emergency Department   Date of Visit:  11/18/2017           Patient Information     Date Of Birth          1975        Your diagnoses for this visit were:     Chest wall muscle strain, left sided     Work related injury        You were seen by Tiffany Schwartz MD.      Follow-up Information     Follow up with Your primary clinic provider In 1 week.        Discharge Instructions       Thank you for giving us the opportunity to see you. The impression is that you have left sided chest wall muscle strain.    Apply ice for the next 2 days, and then add heat.  Continue with over-the-counter topical creams for pain control.    Take ibuprofen up to 600 mg 4 times a day with food for 3-7 days.  Add Tylenol for breakthrough pain.    Please see and follow the work restrictions    Follow-up in the clinic in 7-10 days for recheck.    After discharge, please closely monitor for any new or worsening symptoms. Return to the Emergency Department at any time if your symptoms worsen.            24 Hour Appointment Hotline       To make an appointment at any [facility] call [phone] ([phone]). If you don't have a family doctor or clinic, we will help you find one. [facility] clinics are conveniently located to serve the needs of you and your family.             Review of your medicines      Notice     You have not been prescribed any medications.            Orders Needing Specimen Collection     None      Pending Results     No orders found from 11/16/2017 to 11/19/2017.            Pending Culture Results     No orders found from 11/16/2017 to 11/19/2017.            Pending Results Instructions     If you had any lab results that were not finalized at the time of your Discharge,  "you can call the ED Lab Result RN at [phone]. You will be contacted by this team for any positive Lab results or changes in treatment. The nurses are available 7 days a week from 10A to 6:30P.  You can leave a message 24 hours per day and they will return your call.        Thank you for choosing [facility]       Thank you for choosing [facility] for your care. Our goal is always to provide you with excellent care. Hearing back from our patients is one way we can continue to improve our services. Please take a few minutes to complete the written survey that you may receive in the mail after you visit with us. Thank you!        [employer] Information     [employer] lets you send messages to your doctor, view your test results, renew your prescriptions, schedule appointments and more. To sign up, go to [email or web address] . Click on \"Log in\" on the left side of the screen, which will take you to the Welcome page. Then click on \"Sign up Now\" on the right side of the page.     You will be asked to enter the access code listed below, as well as some personal information. Please follow the directions to create your username and password.     Your access code is: [email or web address]  Expires: 2018  3:46 PM     Your access code will  in 90 days. If you need help or a new code, please call your [facility] clinic or [phone].        Care EveryWhere ID     This is your Care EveryWhere ID. This could be used by other organizations to access your [facility] medical records  [id number]        Equal Access to Services     BRYAN COATES : Hadii heath srivastava hadasho Sochynaali, waaxda luqadaha, qaybta kaalmada aderigobertoyada, jose alberto hull. So [facility] [phone].    ATENCIÓN: Si habla español, tiene a strickland disposición servicios gratuitos de asistencia lingüística. Llame al [phone].    We comply with applicable federal civil rights laws and [place] laws. We do not discriminate on the basis of race, color, " national origin, age, disability, sex, sexual orientation, or gender identity.            After Visit Summary       This is your record. Keep this with you and show to your community pharmacist(s) and doctor(s) at your next visit.

## 2017-11-18 NOTE — ED PROVIDER NOTES
"                                                            [facility] ED Provider Note   CC:     Chief Complaint   Patient presents with     Flank Pain     HPI:  Nilam Orozco is a 42 year old female who presented to the emergency department for evaluation of work-related left sided chest wall pain. 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 She states that she never lifts anything over 40 pounds. She thought she could work through the pain as she has a \" high pain tolerance level,\"  but the pain has continued to worsen. She experiences no pain at rest, but when she does the motion she does when lifting the boxes her pain is a 8/10. When she turns to the right she can feel the pain in her left flank, no pain when moving to the right. Laughing and coughing make the pain worse. Denies shortness of breath       Problem List:  Patient Active Problem List    Diagnosis     Symptomatic menopausal or female climacteric states       MEDS:   There are no discharge medications for this patient.      ALLERGIES:    Allergies   Allergen Reactions     Codeine        Past Surgical History:   Procedure Laterality Date     SURGICAL PATHOLOGY EXAM      multiple       Social History   Substance Use Topics     Smoking status: Never Smoker     Smokeless tobacco: Never Used     Alcohol use 0.0 oz/week     0 Standard drinks or equivalent per week         Review of Systems   Except as noted in HPI, all other systems were reviewed and are negative    Physical Exam     Vitals were reviewed  Patient Vitals for the past 8 hrs:   BP Temp Pulse Resp SpO2 Weight   11/18/17 1437 (!) 132/91 98.6  F (37  C) 70 14 99 % 66.7 kg (147 lb)     GENERAL APPEARANCE: calm, in no acute distress, cooperative female, alert and orientated  FACE: normal facies  EYES: Pupils are equal  HENT: normal external exam  NECK: no adenopathy or asymmetry  RESP: normal respiratory effort; clear breath " sounds bilaterally  CV: regular rate and rhythm; no significant murmurs, gallops or rubs  CHEST: Left lateral chest wall tenderness, very localized  ABD: soft, no tenderness; no rebound or guarding; bowel sounds are normal  MS: no gross deformities noted; normal muscle tone.  SKIN: no worrisome rash  NEURO: no facial droop; no focal deficits, speech is normal      Available Lab/Imaging Results   No results found for this or any previous visit (from the past 24 hour(s)).      Impression     Final diagnoses:   Chest wall muscle strain, left sided   Work related injury       ED Course & Medical Decision Making   Nilam Orozco is a 42 year old female who presented to the emergency department with a work-related left chest wall injury from a probable strain.  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  She twisted to the left and had a sudden twinge of pain.  Over the next half hour the pain increased.  She tried to work through the pain, but was becoming too intense.  Pain was rated 5-8/10 depending on movement.  If she is able to sit completely still, the pain is very minimal.  She has never had any previous back or musculoskeletal injuries from her work.  She has been working this current job for the past 4 years had all these and it is physically demanding.  She denies any numbness or tingling, difficulty breathing, or any associated rash.  On exam, patient has localized tenderness to the left lateral chest wall near the junction of the serratus anterior and the latissimus dorsi.  Patient has normal breath sounds.  I explained that she likely has a chest wall muscle strain and that this will resolve with time.  She will have work restrictions for the next 7-10 days and given that [facility] is coming up next week, we'll have her follow up afterwards.  Take ibuprofen and add Tylenol.  Use topical creams as needed.  Return to the ED if symptoms worsen.      Written after-visit summary and  instructions were given at the time of discharge.      There are no discharge medications for this patient.    This document serves as a record of services personally performed by Tiffany Schwartz MD. It was created on their behalf by Kenia Foreman, a trained medical scribe. The creation of this record is based on the provider's personal observations and the statements of the patient. This document has been checked and approved by the attending provider.    Note: Chart documentation done in part with Dragon Voice Recognition software. Although reviewed after completion, some word and grammatical errors may remain.          This note was completed in part using Dragon voice recognition, and may contain word and grammatical errors.        Tiffany Schwartz MD  11/18/17 8002

## 2017-11-24 ENCOUNTER — OFFICE VISIT (OUTPATIENT)
Dept: FAMILY MEDICINE | Facility: OTHER | Age: 42
End: 2017-11-24
Payer: OTHER MISCELLANEOUS

## 2017-11-24 VITALS
SYSTOLIC BLOOD PRESSURE: 108 MMHG | DIASTOLIC BLOOD PRESSURE: 68 MMHG | WEIGHT: 149.7 LBS | HEART RATE: 78 BPM | RESPIRATION RATE: 20 BRPM | BODY MASS INDEX: 23.45 KG/M2 | TEMPERATURE: 97.9 F

## 2017-11-24 DIAGNOSIS — R07.81 RIB TENDERNESS: Primary | ICD-10-CM

## 2017-11-24 PROCEDURE — 99213 OFFICE O/P EST LOW 20 MIN: CPT | Performed by: FAMILY MEDICINE

## 2017-11-24 RX ORDER — TIZANIDINE 2 MG/1
2 TABLET ORAL 3 TIMES DAILY PRN
Qty: 20 TABLET | Refills: 0 | Status: SHIPPED | OUTPATIENT
Start: 2017-11-24 | End: 2017-12-18

## 2017-11-24 ASSESSMENT — PAIN SCALES - GENERAL: PAINLEVEL: EXTREME PAIN (8)

## 2017-11-24 NOTE — PROGRESS NOTES
SUBJECTIVE:   Nilam Orozco is a 42 year old female who presents to clinic today for the following health issues:      ED/UC Followup:    Facility:  [facility]  Date of visit: 11/18/17  Reason for visit: lt side chest wall strain  Current Status: better           note dictated and pending  electronically signed  Cesar Thompson MD

## 2017-11-24 NOTE — MR AVS SNAPSHOT
"              After Visit Summary   11/24/2017    Nilam Orozco    MRN: [id number]           Patient Information     Date Of Birth          1975        Visit Information        Provider Department      11/24/2017 1:00 PM Cesar Thompson MD [facility]        Today's Diagnoses     Rib tenderness    -  1       Follow-ups after your visit        Your next 10 appointments already scheduled     Dec 18, 2017  3:00 PM CST   Office Visit with Cesar Thompson MD   [facility] ([facility]    [address] [facility] [id number] [phone]           Bring a current list of meds and any records pertaining to this visit. For Physicals, please bring immunization records and any forms needing to be filled out. Please arrive 10 minutes early to complete paperwork.              Who to contact     If you have questions or need follow up information about today's clinic visit or your schedule please contact [facility] directly at [phone].  Normal or non-critical lab and imaging results will be communicated to you by Status4hart, letter or phone within 4 business days after the clinic has received the results. If you do not hear from us within 7 days, please contact the clinic through Status4hart or phone. If you have a critical or abnormal lab result, we will notify you by phone as soon as possible.  Submit refill requests through [employer] or call your pharmacy and they will forward the refill request to us. Please allow 3 business days for your refill to be completed.          Additional Information About Your Visit        Status4hart Information     [employer] lets you send messages to your doctor, view your test results, renew your prescriptions, schedule appointments and more. To sign up, go to [email or web address] . Click on \"Log in\" on the left side of the screen, which will take you to the Welcome page. Then click on \"Sign up Now\" on the right side of the page.     You " will be asked to enter the access code listed below, as well as some personal information. Please follow the directions to create your username and password.     Your access code is: [email or web address]  Expires: 2018  3:46 PM     Your access code will  in 90 days. If you need help or a new code, please call your [place] clinic or [phone].        Care EveryWhere ID     This is your Care EveryWhere ID. This could be used by other organizations to access your [place] medical records  [id number]        Your Vitals Were     Pulse Temperature Respirations Last Period BMI (Body Mass Index)       78 97.9  F (36.6  C) (Temporal) 20 2016 23.45 kg/m2        Blood Pressure from Last 3 Encounters:   17 108/68   17 (!) 132/91   17 108/60    Weight from Last 3 Encounters:   17 149 lb 11.2 oz (67.9 kg)   17 147 lb (66.7 kg)   17 152 lb (68.9 kg)              Today, you had the following     No orders found for display         Today's Medication Changes          These changes are accurate as of: 17  3:53 PM.  If you have any questions, ask your nurse or doctor.               Start taking these medicines.        Dose/Directions    tiZANidine 2 MG tablet   Commonly known as:  ZANAFLEX   Used for:  Rib tenderness   Started by:  Cesar Thompson MD        Dose:  2 mg   Take 1 tablet (2 mg) by mouth 3 times daily as needed for muscle spasms   Quantity:  20 tablet   Refills:  0            Where to get your medicines      These medications were sent to [facility] - [place], [place] - [address]  [address], [facility] [address]     Phone:  [phone]     tiZANidine 2 MG tablet                Primary Care Provider Fax #    Provider Not In System [phone]                Equal Access to Services     BRYAN COATES : Nora Cooper, walissa lurhettadaha, qaybta kaalmakarla agrawal, jose alberto hull. So [facility] [phone].    ATENCIÓN: Si  carlie chapman, tiene a strickland disposición servicios gratuitos de asistencia lingüística. Jose E malave [phone].    We comply with applicable federal civil rights laws and [place] laws. We do not discriminate on the basis of race, color, national origin, age, disability, sex, sexual orientation, or gender identity.            Thank you!     Thank you for choosing [facility]  for your care. Our goal is always to provide you with excellent care. Hearing back from our patients is one way we can continue to improve our services. Please take a few minutes to complete the written survey that you may receive in the mail after your visit with us. Thank you!             Your Updated Medication List - Protect others around you: Learn how to safely use, store and throw away your medicines at www.disposemymeds.org.          This list is accurate as of: 11/24/17  3:53 PM.  Always use your most recent med list.                   Brand Name Dispense Instructions for use Diagnosis    tiZANidine 2 MG tablet    ZANAFLEX    20 tablet    Take 1 tablet (2 mg) by mouth 3 times daily as needed for muscle spasms    Rib tenderness

## 2017-11-24 NOTE — NURSING NOTE
"Chief Complaint   Patient presents with     Hospital F/U     seen in ED 11/18/17, WORK COMP d.o.i. 11/18/17 lt side chest wall strain       Initial /68 (BP Location: Left arm, Patient Position: Chair, Cuff Size: Adult Regular)  Pulse 78  Temp 97.9  F (36.6  C) (Temporal)  Resp 20  Wt 149 lb 11.2 oz (67.9 kg)  LMP 06/01/2016  BMI 23.45 kg/m2 Estimated body mass index is 23.45 kg/(m^2) as calculated from the following:    Height as of 6/19/17: 5' 7\" (1.702 m).    Weight as of this encounter: 149 lb 11.2 oz (67.9 kg).  Medication Reconciliation: complete  "

## 2017-11-25 ENCOUNTER — APPOINTMENT (OUTPATIENT)
Dept: GENERAL RADIOLOGY | Facility: CLINIC | Age: 42
End: 2017-11-25
Attending: FAMILY MEDICINE
Payer: COMMERCIAL

## 2017-11-25 ENCOUNTER — HOSPITAL ENCOUNTER (EMERGENCY)
Facility: CLINIC | Age: 42
Discharge: HOME | End: 2017-11-25
Attending: FAMILY MEDICINE | Admitting: FAMILY MEDICINE
Payer: COMMERCIAL

## 2017-11-25 VITALS
TEMPERATURE: 97.9 F | OXYGEN SATURATION: 99 % | RESPIRATION RATE: 18 BRPM | DIASTOLIC BLOOD PRESSURE: 83 MMHG | SYSTOLIC BLOOD PRESSURE: 113 MMHG

## 2017-11-25 DIAGNOSIS — R07.81 RIB PAIN ON LEFT SIDE: ICD-10-CM

## 2017-11-25 PROCEDURE — 99283 EMERGENCY DEPT VISIT LOW MDM: CPT | Performed by: FAMILY MEDICINE

## 2017-11-25 PROCEDURE — 71101 X-RAY EXAM UNILAT RIBS/CHEST: CPT | Mod: TC,LT

## 2017-11-25 PROCEDURE — 99284 EMERGENCY DEPT VISIT MOD MDM: CPT | Mod: Z6 | Performed by: FAMILY MEDICINE

## 2017-11-25 RX ORDER — OXYCODONE AND ACETAMINOPHEN 5; 325 MG/1; MG/1
1 TABLET ORAL EVERY 4 HOURS PRN
Qty: 20 TABLET | Refills: 0 | Status: SHIPPED | OUTPATIENT
Start: 2017-11-25 | End: 2017-12-02

## 2017-11-25 NOTE — ED AVS SNAPSHOT
[facility] Emergency Department    [address] [place] [address]    Phone:  [phone]    Fax:  [phone]                                       Nilam Orozco   MRN: [id number]    Department:  [facility] Emergency Department   Date of Visit:  11/25/2017           After Visit Summary Signature Page     I have received my discharge instructions, and my questions have been answered. I have discussed any challenges I see with this plan with the nurse or doctor.    ..........................................................................................................................................  Patient/Patient Representative Signature      ..........................................................................................................................................  Patient Representative Print Name and Relationship to Patient    ..................................................               ................................................  Date                                            Time    ..........................................................................................................................................  Reviewed by Signature/Title    ...................................................              ..............................................  Date                                                            Time

## 2017-11-25 NOTE — PROGRESS NOTES
SUBJECTIVE:  Recheck after ED visit for rib pain.  Nilam Orozco was seen in the ED several days prior with pain in her lateral rib area.  This was on both sides, but now has concentrated mostly on the left.  She has a somewhat physical job at times, and was doing a lot of lifting and bending at work, and subsequent to that, she developed the pain in her inferior lateral rib cage.  The pain now is chiefly on the left side, and is associated with movements, bending and twisting.  She does not have shortness of breath, coughing, URI symptoms, and has not had direct trauma to the area involved.  She is otherwise healthy.      PHYSICAL EXAM:   GENERAL:  Exam today shows her in no distress.   VITAL SIGNS:  Her vital signs are all normal with a blood pressure of 102/68, pulse 78, respirations 20.   NECK:  Clear.   HEART:  Clear.   LUNGS:  Clear.  There is no subcutaneous emphysema.   RIBS:  There is no ecchymosis or bruising about the tender area on the left lower lateral rib cage.  She is tender to palpation on the lower lateral rib cage, but not exquisitely so.  She is better.   ABDOMEN:  Negative.  There is no tenderness, and no hepatosplenomegaly or masses.      IMPRESSION:  Left lower lateral rib pain, likely musculoskeletal in nature.      PLAN:  She is significantly better since the onset, and does not appear to have any obvious cause for pain other than musculoskeletal.  In light of that, I recommended that she continue with nonsteroidals, and I did give her some Zanaflex, as she may have some muscular spasm causing some of her pain.  She does relate pain now at about a 2/10, and about 50% improved from initially.  Additionally, I advised her to call back if she has more or increasing difficulty.  It would seem unlikely that she has a stress fracture of a rib, but more likely musculoskeletal straining and pain associated with that.  Remotely, she could be developing herpes zoster, but no rash is evident at this  time.  I did warn her about drowsiness while using the Zanaflex additionally.         CHUCK ADAMS MD             D: 2017 09:04   T: 2017 09:23   MT: EM#[id number]      Name:     KOKO MOONEY   MRN:      [id number]        Account:      [id number]   :      1975           Visit Date:   2017      Document: [id number]

## 2017-11-25 NOTE — ED NOTES
Pt states that she rolled over this morning and felt a pop then experienced increased left chest wall pain from a previous injury.  The pain is worse with coughing, moving, and deep breaths.

## 2017-11-25 NOTE — ED AVS SNAPSHOT
[facility] Emergency Department    [address] [place] [address]    Phone:  [phone]    Fax:  [phone]                                       Nilam Orozco   MRN: [id number]    Department:  [facility] Emergency Department   Date of Visit:  11/25/2017           Patient Information     Date Of Birth          1975        Your diagnoses for this visit were:     Rib pain on left side        You were seen by Tiffany Schwartz MD.      Follow-up Information     Follow up with Your primary clinic provider In 3 days.    Why:  if not improving        Discharge Instructions       We're sorry about your severe left-sided rib pain.  The x-rays do not show any signs of fracture or displacement of the ribs.    Continue the Zanaflex muscle relaxer, and add Percocet for breakthrough pain.    Follow-up in the clinic in 3-5 days for recheck.    After discharge, please closely monitor for any new or worsening symptoms. Return to the Emergency Department at any time if your symptoms worsen.        Discharge References/Attachments     RIB CONTUSION OR MINOR FRACTURE (ENGLISH)      Future Appointments        Provider Department Dept Phone Center    12/18/2017 3:00 PM Cesar Thompson MD [facility] [phone] [facility]      24 Hour Appointment Hotline       To make an appointment at any [facility], call [phone] ([phone]). If you don't have a family doctor or clinic, we will help you find one. [facility] are conveniently located to serve the needs of you and your family.             Review of your medicines      START taking        Dose / Directions Last dose taken    oxyCODONE-acetaminophen 5-325 MG per tablet   Commonly known as:  PERCOCET   Dose:  1 tablet   Quantity:  20 tablet        Take 1 tablet by mouth every 4 hours as needed for moderate to severe pain   Refills:  0          Our records show that you are taking the medicines listed below. If these  "are incorrect, please call your family doctor or clinic.        Dose / Directions Last dose taken    tiZANidine 2 MG tablet   Commonly known as:  ZANAFLEX   Dose:  2 mg   Quantity:  20 tablet        Take 1 tablet (2 mg) by mouth 3 times daily as needed for muscle spasms   Refills:  0                Prescriptions were sent or printed at these locations (1 Prescription)                   [facility]   [address] [address]    Telephone:  [phone]   Fax:  [phone]   Hours:                  Printed at Department/Unit printer (1 of 1)         oxyCODONE-acetaminophen (PERCOCET) 5-325 MG per tablet                Procedures and tests performed during your visit     Ribs XR, unilat 3 views + PA chest,  left      Orders Needing Specimen Collection     None      Pending Results     No orders found from 11/23/2017 to 11/26/2017.            Pending Culture Results     No orders found from 11/23/2017 to 11/26/2017.            Pending Results Instructions     If you had any lab results that were not finalized at the time of your Discharge, you can call the ED Lab Result RN at [phone]. You will be contacted by this team for any positive Lab results or changes in treatment. The nurses are available 7 days a week from 10A to 6:30P.  You can leave a message 24 hours per day and they will return your call.        Thank you for choosing [facility]       Thank you for choosing [facility] for your care. Our goal is always to provide you with excellent care. Hearing back from our patients is one way we can continue to improve our services. Please take a few minutes to complete the written survey that you may receive in the mail after you visit with us. Thank you!        [employer] Information     [employer] lets you send messages to your doctor, view your test results, renew your prescriptions, schedule appointments and more. To sign up, go to [email or web address] . Click on \"Log in\" " "on the left side of the screen, which will take you to the Welcome page. Then click on \"Sign up Now\" on the right side of the page.     You will be asked to enter the access code listed below, as well as some personal information. Please follow the directions to create your username and password.     Your access code is: [email or web address]  Expires: 2018  3:46 PM     Your access code will  in 90 days. If you need help or a new code, please call your [place] clinic or [phone].        Care EveryWhere ID     This is your Care EveryWhere ID. This could be used by other organizations to access your [place] medical records  [id number]        Equal Access to Services     BRYAN COATES : Nora Cooper, megan johnson, edilberto agrawal, jose alberto hull. So [facility] [phone].    ATENCIÓN: Si habla español, tiene a strickland disposición servicios gratuitos de asistencia lingüística. Llame al [phone].    We comply with applicable federal civil rights laws and [place] laws. We do not discriminate on the basis of race, color, national origin, age, disability, sex, sexual orientation, or gender identity.            After Visit Summary       This is your record. Keep this with you and show to your community pharmacist(s) and doctor(s) at your next visit.                  "

## 2017-11-25 NOTE — ED PROVIDER NOTES
ED Provider Note     CC:     Chief Complaint   Patient presents with     Chest Wall Pain          History is obtained from the patient.    HPI: Nilam Orozco is a 42 year old female presenting with acute worsening of left-sided lower rib pain after she rolled over in bed this morning at 5:30.  Patient had an initial work injury last Saturday, November 18 when she was moving boxes off of a [employer].  She was twisting while moving the boxes and had a sudden pain in the left side of her chest and ribs.  She states that she was starting to get better and had only taken one muscle relaxer.  Yesterday after she had her follow-up visit with Dr. Thompson.  Her pain level was down to a 3/10.  This morning she was rolling over in bed and felt a sudden pop in the left side of her chest and she was almost in tears.  Since then, about an hour ago, she is having difficulty taking deep breaths, and having severe pain with coughing.  Pain is very localized left lower lateral rib area.  There has been no alleviating factors.      Patient Active Problem List   Diagnosis     Symptomatic menopausal or female climacteric states       MEDS:     No current facility-administered medications on file prior to encounter.   Current Outpatient Prescriptions on File Prior to Encounter:  tiZANidine (ZANAFLEX) 2 MG tablet Take 1 tablet (2 mg) by mouth 3 times daily as needed for muscle spasms       Allergies: Codeine    Triage and ursing notes were reviewed.    ROS: Negative except in HPI    Physical Exam:  Vitals:    11/25/17 0633 11/25/17 0635   BP: 113/83    Resp: 18    Temp:  97.9  F (36.6  C)   TempSrc:  Oral   SpO2: 99%      GENERAL APPEARANCE: Alert, moderate distress due to pain   HEAD: atraumatic  EYES: PERRL, EOMI  HENT: Normal external exam  NECK: no adenopathy or masses, trachea is midline  RESP: lungs clear to auscultation - no rales, rhonchi or  wheezes  CHEST: Exquisite Left lower rib tenderness.  CV: regular rate and rhythm, no significant murmurs or rubs  ABDOMEN: soft, nontender, no masses with normal bowel sounds  EXT: Grossly normal  SKIN: no rash; as above      Results for orders placed or performed during the hospital encounter of 11/25/17 (from the past 24 hour(s))   Ribs XR, unilat 3 views + PA chest,  left    Narrative    XR RIBS & CHEST LT 3VW 11/25/2017 7:12 AM     HISTORY: acute worsening left lower rib; work injury Nov 18;     COMPARISON: None      Impression    IMPRESSION: The cardiac silhouette and pulmonary vasculature are  normal. No evidence of pneumothorax. The lungs are clear. No displaced  fractures are identified on rib detail views.    ALETA MELLO MD           Impression:  Final diagnoses:   Rib pain on left side         ED Course & Medical Decision Making (Plan):  Nilam Orozco is a 42 year old female who presents with acute worsening of left-sided rib pain after she rolled over in bed this morning at 5:30 AM.  Patient felt a pop in the left lower rib area and her pain rapidly worsened.  Pain level was 8/10, and worsened by coughing and breathing.  Pain is somewhat relieved by laying still.  Patient had an initial work-related injury last Saturday.  She was able to the take ibuprofen through the week with improvement in her pain level.  She was given a prescription for tizanidine muscle relaxer and only took one tablet yesterday.  Patient was seen shortly after arrival.  She was exquisitely tender in the left lower rib region.  Patient declined pain medication.  Chest x-ray and unilateral left rib series reviewed by me revealed no obvious rib fracture.  Patient likely has a rib strain versus contusion versus minor fracture.  Take ibuprofen and reserve Percocet for breakthrough pain.  Recheck in 3-5 days.  Return to the ED if symptoms worsen.  Written after-visit summary and instructions were given at the time of  discharge.          New Prescriptions    OXYCODONE-ACETAMINOPHEN (PERCOCET) 5-325 MG PER TABLET    Take 1 tablet by mouth every 4 hours as needed for moderate to severe pain           This note was completed in part using Dragon voice recognition, and may contain word and grammatical errors.        Tiffany Schwartz MD  11/25/17 0546

## 2017-11-25 NOTE — LETTER
[facility]  Emergency Room  [address] [place]   [address]  Tel: [phone]   Fax: [phone]  2017    Nilam Orozco  [address] [address] [address]  [phone] (home)     : 1975          To Whom it May Concern:    Nilam Orozco was seen in our ER today, 2017. I expect her condition to improve over the next 5-7days.  She will miss work for 1-2 days may return to work, without restriction, when improved. If not improved during the above expected time period,  then I have encouraged her to be rechecked in her clinic for further evaluation.      Please contact me for questions or concerns.    Sincerely,      Julian Schwartz MD

## 2017-11-25 NOTE — DISCHARGE INSTRUCTIONS
We're sorry about your severe left-sided rib pain.  The x-rays do not show any signs of fracture or displacement of the ribs.    Continue the Zanaflex muscle relaxer, and add Percocet for breakthrough pain.    Follow-up in the clinic in 3-5 days for recheck.    After discharge, please closely monitor for any new or worsening symptoms. Return to the Emergency Department at any time if your symptoms worsen.

## 2017-11-25 NOTE — ED NOTES
Instructed pt on splinting and taking long deep breaths to avoid atelectasis during this time of healing.

## 2017-12-18 ENCOUNTER — OFFICE VISIT (OUTPATIENT)
Dept: FAMILY MEDICINE | Facility: OTHER | Age: 42
End: 2017-12-18
Payer: OTHER MISCELLANEOUS

## 2017-12-18 VITALS
RESPIRATION RATE: 20 BRPM | BODY MASS INDEX: 23.84 KG/M2 | WEIGHT: 152.2 LBS | TEMPERATURE: 97.8 F | SYSTOLIC BLOOD PRESSURE: 108 MMHG | DIASTOLIC BLOOD PRESSURE: 68 MMHG | HEART RATE: 80 BPM

## 2017-12-18 DIAGNOSIS — R07.81 RIB TENDERNESS: Primary | ICD-10-CM

## 2017-12-18 PROCEDURE — 99213 OFFICE O/P EST LOW 20 MIN: CPT | Performed by: FAMILY MEDICINE

## 2017-12-18 ASSESSMENT — PAIN SCALES - GENERAL: PAINLEVEL: NO PAIN (0)

## 2017-12-18 NOTE — MR AVS SNAPSHOT
"              After Visit Summary   2017    Nilam Orozco    MRN: [id number]           Patient Information     Date Of Birth          1975        Visit Information        Provider Department      2017 3:00 PM Cesar Thompson MD [facility]        Today's Diagnoses     Rib tenderness    -  1       Follow-ups after your visit        Who to contact     If you have questions or need follow up information about today's clinic visit or your schedule please contact [facility] directly at [phone].  Normal or non-critical lab and imaging results will be communicated to you by MyChart, letter or phone within 4 business days after the clinic has received the results. If you do not hear from us within 7 days, please contact the clinic through [employer] or phone. If you have a critical or abnormal lab result, we will notify you by phone as soon as possible.  Submit refill requests through Gabstr or call your pharmacy and they will forward the refill request to us. Please allow 3 business days for your refill to be completed.          Additional Information About Your Visit        MyChart Information     Gabstr lets you send messages to your doctor, view your test results, renew your prescriptions, schedule appointments and more. To sign up, go to [email or web address] . Click on \"Log in\" on the left side of the screen, which will take you to the Welcome page. Then click on \"Sign up Now\" on the right side of the page.     You will be asked to enter the access code listed below, as well as some personal information. Please follow the directions to create your username and password.     Your access code is: [email or web address]  Expires: 2018  3:46 PM     Your access code will  in 90 days. If you need help or a new code, please call your [facility] or [phone].        Care EveryWhere ID     This is your Care EveryWhere ID. This could be used by other organizations to " access your [facility] medical records  [id number]        Your Vitals Were     Pulse Temperature Respirations Last Period BMI (Body Mass Index)       80 97.8  F (36.6  C) (Temporal) 20 06/01/2016 23.84 kg/m2        Blood Pressure from Last 3 Encounters:   12/18/17 108/68   11/25/17 113/83   11/24/17 108/68    Weight from Last 3 Encounters:   12/18/17 152 lb 3.2 oz (69 kg)   11/24/17 149 lb 11.2 oz (67.9 kg)   11/18/17 147 lb (66.7 kg)              Today, you had the following     No orders found for display       Primary Care Provider Fax #    Physician No Ref-Primary [phone]       No address on file        Equal Access to Services     BRYAN COATES : Nora Cooper, waaxda luqadaha, qaybta kaalmada tanja, jose alberto leal . So [facility] [phone].    ATENCIÓN: Si habla español, tiene a strickland disposición servicios gratuitos de asistencia lingüística. Llame al [phone].    We comply with applicable federal civil rights laws and [place] laws. We do not discriminate on the basis of race, color, national origin, age, disability, sex, sexual orientation, or gender identity.            Thank you!     Thank you for choosing [facility]  for your care. Our goal is always to provide you with excellent care. Hearing back from our patients is one way we can continue to improve our services. Please take a few minutes to complete the written survey that you may receive in the mail after your visit with us. Thank you!             Your Updated Medication List - Protect others around you: Learn how to safely use, store and throw away your medicines at www.disposemymeds.org.      Notice  As of 12/18/2017  3:37 PM    You have not been prescribed any medications.

## 2017-12-18 NOTE — NURSING NOTE
"Chief Complaint   Patient presents with     Back Pain     WORK COMP       Initial /68 (BP Location: Left arm, Patient Position: Chair, Cuff Size: Adult Regular)  Pulse 80  Temp 97.8  F (36.6  C) (Temporal)  Resp 20  Wt 152 lb 3.2 oz (69 kg)  LMP 06/01/2016  BMI 23.84 kg/m2 Estimated body mass index is 23.84 kg/(m^2) as calculated from the following:    Height as of 6/19/17: 5' 7\" (1.702 m).    Weight as of this encounter: 152 lb 3.2 oz (69 kg).  Medication Reconciliation: complete  "

## 2017-12-18 NOTE — PROGRESS NOTES
SUBJECTIVE:   Nilam Orozco is a 42 year old female who presents to clinic today for the following health issues:  Chief Complaint   Patient presents with     Back Pain     WORK COMP     SUBJECTIVE:  Nilam Orozco, a 42 year old female scheduled an appointment to discuss the following issues:  Rib tenderness: tai w/c rib injury of left lateral rib cage. Pt had actually had increased pain one day p visit here. She was seen in the [facility] ED and had xrays which were nl.  Likely she had a subluxation of rib on rib cartilage. Her symptoms have now resolved.  She needs clearance to rtw w/o restrictions.    Past Medical History:   Diagnosis Date     Lipoma     multiple on legs     No current outpatient prescriptions on file.         EXAM:  /68 (BP Location: Left arm, Patient Position: Chair, Cuff Size: Adult Regular)  Pulse 80  Temp 97.8  F (36.6  C) (Temporal)  Resp 20  Wt 152 lb 3.2 oz (69 kg)  LMP 06/01/2016  BMI 23.84 kg/m2  LUNGS:  Clear to auscultation. No tenderness on the rib cage. No rash evident       IMPRESSION/PLAN:  Encounter Diagnosis   Name Primary?     Rib tenderness, resolved. Note given allowing pt to rtw w/o restrictions        Cesar Thompson

## 2018-09-21 ENCOUNTER — OFFICE VISIT (OUTPATIENT)
Dept: FAMILY MEDICINE | Facility: OTHER | Age: 43
End: 2018-09-21
Payer: COMMERCIAL

## 2018-09-21 VITALS
BODY MASS INDEX: 25.51 KG/M2 | HEART RATE: 72 BPM | SYSTOLIC BLOOD PRESSURE: 116 MMHG | HEIGHT: 66 IN | DIASTOLIC BLOOD PRESSURE: 70 MMHG | RESPIRATION RATE: 16 BRPM | WEIGHT: 158.7 LBS | TEMPERATURE: 96.8 F

## 2018-09-21 DIAGNOSIS — L72.9 BENIGN CYST OF SKIN: Primary | ICD-10-CM

## 2018-09-21 DIAGNOSIS — R60.9 DEPENDENT EDEMA: ICD-10-CM

## 2018-09-21 DIAGNOSIS — Z12.31 ENCOUNTER FOR SCREENING MAMMOGRAM FOR BREAST CANCER: ICD-10-CM

## 2018-09-21 PROCEDURE — 99213 OFFICE O/P EST LOW 20 MIN: CPT | Performed by: PHYSICIAN ASSISTANT

## 2018-09-21 ASSESSMENT — PAIN SCALES - GENERAL: PAINLEVEL: MILD PAIN (2)

## 2018-09-21 NOTE — PROGRESS NOTES
SUBJECTIVE:   Nilam Orozco is a 43 year old female who presents to clinic today for the following health issues:      Concern - right ankle swelling  Onset: 8-9 months    Description:   swelling    Intensity: mild, moderate    Progression of Symptoms:  same    Accompanying Signs & Symptoms:  nothing    Previous history of similar problem:   YES    Precipitating factors:   Worsened by: being on her feet    Alleviating factors:  Improved by: rest, elevation    Therapies Tried and outcome: ice, heat, no relief    Patient is a 43 year old female who presents today with several months worth of transient right ankle swelling. She said that 8-9 months ago she hit the ankle on a brick wall and as she recovered she noticed a small moveable lump anterior inferior to her lateral malleolus. This lump has not increased in size since then, caused pain or hindered range of motion. She has noticed that the ankle swells after she has spent all day on her feet. 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 At the end of the day after she has elevated her ankle the swelling recedes. She notices that the ankle can be tender in the afternoons during increased swelling. She denies numbness, tingling, rash, recent trauma or surgery, reduced or impaired ROM, weakness, chest pain, trouble breathing, change in bowel or bladder.     Problem list and histories reviewed & adjusted, as indicated.  Additional history: as documented    Patient Active Problem List   Diagnosis     Symptomatic menopausal or female climacteric states     Past Surgical History:   Procedure Laterality Date     SURGICAL PATHOLOGY EXAM      multiple       Social History   Substance Use Topics     Smoking status: Former Smoker     Types: Cigarettes     Smokeless tobacco: Never Used     Alcohol use 0.0 oz/week     0 Standard drinks or equivalent per week     Family History   Problem Relation Age of Onset      "Coronary Artery Disease Mother      Aneurysm Mother      Other Cancer Maternal Grandmother      cervical     Mental Illness Paternal Grandfather      alzheimers     Cancer Nephew      Breast Cancer Paternal Aunt          No current outpatient prescriptions on file.     Allergies   Allergen Reactions     Codeine      Labs reviewed in EPIC    Reviewed and updated as needed this visit by clinical staff  Tobacco  Allergies  Meds  Med Hx  Surg Hx  Fam Hx  Soc Hx      Reviewed and updated as needed this visit by Provider         ROS:  Constitutional, HEENT, cardiovascular, pulmonary, gi and gu systems are negative, except as otherwise noted.    OBJECTIVE:     /70 (BP Location: Left arm, Patient Position: Chair, Cuff Size: Adult Large)  Pulse 72  Temp 96.8  F (36  C) (Oral)  Resp 16  Ht 5' 6.25\" (1.683 m)  Wt 158 lb 11.2 oz (72 kg)  LMP 06/01/2016  BMI 25.42 kg/m2  Body mass index is 25.42 kg/(m^2).  GENERAL: healthy, alert and no distress  RESP: lungs clear to auscultation - no rales, rhonchi or wheezes  CV: regular rate and rhythm, normal S1 S2, no S3 or S4, no murmur, click or rub, no peripheral edema and peripheral pulses strong  MS: no gross musculoskeletal defects noted, no edema, 2-3cm fluctuant cyst inferior anterior to the right lateral malleolus, non-tender, cool to touch  NEURO: Normal strength and tone, mentation intact and speech normal  PSYCH: mentation appears normal, affect normal/bright    Diagnostic Test Results:  none     ASSESSMENT/PLAN:     1. Benign cyst of skin  Provided reassurance to the patient, encouraged continued monitoring of the area. If pain increases, does not improve or new symptoms appear patient will contact clinic. We can consider general surgery consult if this occurs.     2. Dependent Edema  Discussed dependent edema pathophysiology with patient and encouraged use of compression stocking/s during the day and elevation of lower extremities when able.     3. Encounter " for screening mammogram for breast cancer  Patient is overdue for mammogram. Referral placed today and patient may schedule at a time convenient to her.   - *MA Screening Digital Bilateral; Future    Follow up with clinic as needed or sooner if conditions change, worsen or fail to improve as expected.      Moises Rodriguez PA-C  [facility]

## 2018-09-21 NOTE — PATIENT INSTRUCTIONS
Leg Swelling in Both Legs    Swelling of the feet, ankles, and legs is called edema. It is caused by excess fluid that has collected in the tissues. Extra fluid in the body settles in the lowest part because of gravity. This is why the legs and feet are most affected.  Some of the causes for edema include:    Disease of the heart like congestive heart failure    Standing or sitting for long periods of time    Infection of the feet or legs    Blood pooling in the veins of your legs (venous insufficiency)    Dilated veins in your lower leg (varicose veins)    Garters or other clothing that is tight on your legs. This will cause blood to pool in your legs because the clothing limits blood flow.    Some medicines such as hormones like birth control pills, some blood pressure medicines like calcium channel blockers (amlodipine) and steroids, some antidepressants like MAO inhibitors and tricyclics    Menstrual periods that cause you to retain fluids    Many types of renal disease    Liver failure or cirrhosis    Pregnancy, some swelling is normal, but a sudden increase in leg swelling or weight gain can be a sign of a dangerous complication of pregnancy    Poor nutrition    Thyroid disease  Medical treatment will depend on what is causing the swelling in your legs. Your healthcare provider may prescribe water pills (diuretics) to get rid of the extra fluid.  Home care  Follow these guidelines when caring for yourself at home:    Don't wear clothing like garters that is tight on your legs.    Keep your legs up while lying or sitting.    If infection, injury, or recent surgery is causing the swelling, stay off your legs as much as possible until symptoms get better.    If your healthcare provider says that your leg swelling is caused by venous insufficiency or varicose veins, don't sit or  one place for long periods of time. Take breaks and walk about every few hours. Brisk walking is a good exercise. It helps  circulate the blood that has collected in your leg. Talk with your provider about using support stockings to stop daytime leg swelling.    If your provider says that heart disease is causing your leg swelling, follow a low-salt diet to stop extra fluid from staying in your body. You may also need medicine.  Follow-up care  Follow up with your healthcare provider, or as advised.  When to seek medical advice  Call your healthcare provider right away if any of these occur:    New shortness of breath or chest pain    Shortness of breath or chest pain that gets worse    Swelling in both legs or ankles that gets worse    Swelling of the abdomen    Redness, warmth, or swelling in one leg    Fever of 100.4 F (38 C) or higher, or as directed by your healthcare provider    Yellow color to your skin or eyes    Rapid, unexplained weight gain    Having to sleep upright or use an increased number of pillows  Date Last Reviewed: 3/31/2016    2808-8252 [employer]. [address] [place] [address], [place] [address]. All rights reserved. This information is not intended as a substitute for professional medical care. Always follow your healthcare professional's instructions.

## 2018-09-21 NOTE — MR AVS SNAPSHOT
After Visit Summary   9/21/2018    Nilam Orozco    MRN: [id number]           Patient Information     Date Of Birth          1975        Visit Information        Provider Department      9/21/2018 4:40 PM Moises Rodriguez PA-C [facility]        Today's Diagnoses     Screening for iron deficiency anemia    -  1      Care Instructions      Leg Swelling in Both Legs    Swelling of the feet, ankles, and legs is called edema. It is caused by excess fluid that has collected in the tissues. Extra fluid in the body settles in the lowest part because of gravity. This is why the legs and feet are most affected.  Some of the causes for edema include:    Disease of the heart like congestive heart failure    Standing or sitting for long periods of time    Infection of the feet or legs    Blood pooling in the veins of your legs (venous insufficiency)    Dilated veins in your lower leg (varicose veins)    Garters or other clothing that is tight on your legs. This will cause blood to pool in your legs because the clothing limits blood flow.    Some medicines such as hormones like birth control pills, some blood pressure medicines like calcium channel blockers (amlodipine) and steroids, some antidepressants like MAO inhibitors and tricyclics    Menstrual periods that cause you to retain fluids    Many types of renal disease    Liver failure or cirrhosis    Pregnancy, some swelling is normal, but a sudden increase in leg swelling or weight gain can be a sign of a dangerous complication of pregnancy    Poor nutrition    Thyroid disease  Medical treatment will depend on what is causing the swelling in your legs. Your healthcare provider may prescribe water pills (diuretics) to get rid of the extra fluid.  Home care  Follow these guidelines when caring for yourself at home:    Don't wear clothing like garters that is tight on your legs.    Keep your legs up while lying or sitting.    If infection, injury,  or recent surgery is causing the swelling, stay off your legs as much as possible until symptoms get better.    If your healthcare provider says that your leg swelling is caused by venous insufficiency or varicose veins, don't sit or  one place for long periods of time. Take breaks and walk about every few hours. Brisk walking is a good exercise. It helps circulate the blood that has collected in your leg. Talk with your provider about using support stockings to stop daytime leg swelling.    If your provider says that heart disease is causing your leg swelling, follow a low-salt diet to stop extra fluid from staying in your body. You may also need medicine.  Follow-up care  Follow up with your healthcare provider, or as advised.  When to seek medical advice  Call your healthcare provider right away if any of these occur:    New shortness of breath or chest pain    Shortness of breath or chest pain that gets worse    Swelling in both legs or ankles that gets worse    Swelling of the abdomen    Redness, warmth, or swelling in one leg    Fever of 100.4 F (38 C) or higher, or as directed by your healthcare provider    Yellow color to your skin or eyes    Rapid, unexplained weight gain    Having to sleep upright or use an increased number of pillows  Date Last Reviewed: 3/31/2016    2820-6784 [employer]. [address] [place] [address], [place] [address]. All rights reserved. This information is not intended as a substitute for professional medical care. Always follow your healthcare professional's instructions.                Follow-ups after your visit        Who to contact     If you have questions or need follow up information about today's clinic visit or your schedule please contact [facility] directly at [phone].  Normal or non-critical lab and imaging results will be communicated to you by MyChart, letter or phone within 4 business days after the clinic has received the results. If  "you do not hear from us within 7 days, please contact the clinic through [employer] or phone. If you have a critical or abnormal lab result, we will notify you by phone as soon as possible.  Submit refill requests through [employer]uhook or call your pharmacy and they will forward the refill request to us. Please allow 3 business days for your refill to be completed.          Additional Information About Your Visit        [employer] Information     Nuhook lets you send messages to your doctor, view your test results, renew your prescriptions, schedule appointments and more. To sign up, go to [email or web address] . Click on \"Log in\" on the left side of the screen, which will take you to the Welcome page. Then click on \"Sign up Now\" on the right side of the page.     You will be asked to enter the access code listed below, as well as some personal information. Please follow the directions to create your username and password.     Your access code is: [email or web address]  Expires: 2018  4:14 PM     Your access code will  in 90 days. If you need help or a new code, please call your [facility] clinic or [phone].        Care EveryWhere ID     This is your Care EveryWhere ID. This could be used by other organizations to access your [facility] medical records  [id number]        Your Vitals Were     Pulse Temperature Respirations Height Last Period BMI (Body Mass Index)    72 96.8  F (36  C) (Oral) 16 5' 6.25\" (1.683 m) 2016 25.42 kg/m2       Blood Pressure from Last 3 Encounters:   18 116/70   17 108/68   17 113/83    Weight from Last 3 Encounters:   18 158 lb 11.2 oz (72 kg)   17 152 lb 3.2 oz (69 kg)   17 149 lb 11.2 oz (67.9 kg)              Today, you had the following     No orders found for display       Primary Care Provider Fax #    Physician No Ref-Primary [phone]       No address on file        Equal Access to Services     BRYAN COATES AH: Nora Cooper, " megan johnson, carlos manuelbrendon beckeryahaira padmajaermelinda, jose alberto melchorin hayaashakira tairigoberto rubenaurora laRobertdianne ah. So [facility] [phone].    ATENCIÓN: Si habla español, tiene a strickland disposición servicios gratuitos de asistencia lingüística. Llame al [phone].    We comply with applicable federal civil rights laws and [place] laws. We do not discriminate on the basis of race, color, national origin, age, disability, sex, sexual orientation, or gender identity.            Thank you!     Thank you for choosing [facility]  for your care. Our goal is always to provide you with excellent care. Hearing back from our patients is one way we can continue to improve our services. Please take a few minutes to complete the written survey that you may receive in the mail after your visit with us. Thank you!             Your Updated Medication List - Protect others around you: Learn how to safely use, store and throw away your medicines at www.disposemymeds.org.      Notice  As of 9/21/2018  5:04 PM    You have not been prescribed any medications.

## 2018-09-21 NOTE — NURSING NOTE
Health Maintenance Due   Topic Date Due     HIV SCREEN (SYSTEM ASSIGNED)  07/11/1991     INFLUENZA VACCINE (1) 09/01/2018     Tana ELENA LPN

## 2019-02-12 ENCOUNTER — OFFICE VISIT (OUTPATIENT)
Dept: FAMILY MEDICINE | Facility: OTHER | Age: 44
End: 2019-02-12
Payer: OTHER MISCELLANEOUS

## 2019-02-12 VITALS
DIASTOLIC BLOOD PRESSURE: 72 MMHG | TEMPERATURE: 98.2 F | HEART RATE: 84 BPM | WEIGHT: 158.2 LBS | RESPIRATION RATE: 20 BRPM | HEIGHT: 67 IN | SYSTOLIC BLOOD PRESSURE: 112 MMHG | BODY MASS INDEX: 24.83 KG/M2

## 2019-02-12 DIAGNOSIS — S49.92XA SHOULDER INJURY, LEFT, INITIAL ENCOUNTER: Primary | ICD-10-CM

## 2019-02-12 PROCEDURE — 99213 OFFICE O/P EST LOW 20 MIN: CPT | Performed by: FAMILY MEDICINE

## 2019-02-12 RX ORDER — PREDNISONE 20 MG/1
TABLET ORAL
Qty: 13 TABLET | Refills: 0 | Status: SHIPPED | OUTPATIENT
Start: 2019-02-12 | End: 2019-02-27

## 2019-02-12 ASSESSMENT — PAIN SCALES - GENERAL: PAINLEVEL: SEVERE PAIN (6)

## 2019-02-12 ASSESSMENT — MIFFLIN-ST. JEOR: SCORE: 1387.28

## 2019-02-12 NOTE — PROGRESS NOTES
SUBJECTIVE:   Nilam Orozco is a 43 year old female who presents to clinic today for the following health issues:  Chief Complaint   Patient presents with     Shoulder Injury     left shoulder injury d.o.i. 02/11/19 WORK COMP         note dictated and pending  electronically signed  Cesar Thompson MD

## 2019-02-13 ENCOUNTER — HOSPITAL ENCOUNTER (OUTPATIENT)
Dept: PHYSICAL THERAPY | Facility: CLINIC | Age: 44
Setting detail: THERAPIES SERIES
End: 2019-02-13
Attending: FAMILY MEDICINE
Payer: OTHER MISCELLANEOUS

## 2019-02-13 DIAGNOSIS — S49.92XA SHOULDER INJURY, LEFT, INITIAL ENCOUNTER: ICD-10-CM

## 2019-02-13 PROCEDURE — 97110 THERAPEUTIC EXERCISES: CPT | Mod: GP

## 2019-02-13 PROCEDURE — 97161 PT EVAL LOW COMPLEX 20 MIN: CPT | Mod: GP

## 2019-02-14 NOTE — PROGRESS NOTES
Visit Date:   02/12/2019      WORKER'S COMPENSATION SHOULDER INJURY:  This began yesterday.  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  On the day of injury while lifting or pushing in a repetitive manner suddenly noticed pain in the left shoulder area between the acromion and the neck anteriorly on the shoulder girdle.  She has not fallen or done anything excessive other than possibly repetitive. She has not had particular shoulder injuries in the past.  She has tried taking NSAIDs and icing the area, but the symptoms have persisted and she was advised to seek medical attention by her employer.      Exam today shows her to be in no distress.  She appears to be athletic in appearance.  Her blood pressure is 112/72, pulse 84, respirations 20, temperature 98.2.  Exam is limited to the left shoulder.  She has no tenderness about the upper arm or deltoid area and no tenderness over the subacromial bursa.  The tenderness is on the shoulder girdle proximal to the distal acromion anteriorly.  There is no redness or swelling or deformity evident.  Range of motion of the arm is full, but patient experiences pain with abduction past horizontal and with posterior adduction.  There is no sign of any deformity and again no swelling or outward signs of inflammation.       IMPRESSION:  Overuse syndrome, left shoulder girdle.      PLAN:  Since she has not had any relief with relatively adequate doses of NSAIDs, I elected to put her to put her on a course of prednisone starting at 40 mg for 3 days and tapering over the next 6 days.  Warned her about GI side effects and insomnia with this, and advised her not to take NSAIDs while using this medication.  She could, however, use Tylenol for additional pain relief.  I will have her set up for physical therapy twice a week for 2 weeks.  She should be followed up in about 2  weeks for hopefully resolution.  I did recommend some work restrictions to include no lifting above shoulder level and no repetitive back and forth swiping activity with the left arm.  She understands the plan and will call if increased or no improvement in symptoms over the next week or two.  Again, she will be followed up in about 2 weeks.      Workers Comp with restriction form was filled out additionally today.         CHUCK ADAMS MD             D: 2019   T: 2019   MT: GEORGI      Name:     KOKO MOONEY   MRN:      [id number]        Account:      [id number]   :      1975           Visit Date:   2019      Document: [id number]

## 2019-02-22 ENCOUNTER — HOSPITAL ENCOUNTER (OUTPATIENT)
Dept: PHYSICAL THERAPY | Facility: CLINIC | Age: 44
Setting detail: THERAPIES SERIES
End: 2019-02-22
Attending: FAMILY MEDICINE
Payer: OTHER MISCELLANEOUS

## 2019-02-22 PROCEDURE — 97140 MANUAL THERAPY 1/> REGIONS: CPT | Mod: GP

## 2019-02-22 PROCEDURE — 97110 THERAPEUTIC EXERCISES: CPT | Mod: GP

## 2019-02-27 ENCOUNTER — HOSPITAL ENCOUNTER (OUTPATIENT)
Dept: PHYSICAL THERAPY | Facility: CLINIC | Age: 44
Setting detail: THERAPIES SERIES
End: 2019-02-27
Attending: FAMILY MEDICINE
Payer: OTHER MISCELLANEOUS

## 2019-02-27 ENCOUNTER — OFFICE VISIT (OUTPATIENT)
Dept: FAMILY MEDICINE | Facility: CLINIC | Age: 44
End: 2019-02-27
Payer: OTHER MISCELLANEOUS

## 2019-02-27 VITALS
DIASTOLIC BLOOD PRESSURE: 80 MMHG | TEMPERATURE: 96.4 F | BODY MASS INDEX: 26.39 KG/M2 | OXYGEN SATURATION: 98 % | HEART RATE: 82 BPM | SYSTOLIC BLOOD PRESSURE: 120 MMHG | RESPIRATION RATE: 18 BRPM | WEIGHT: 166 LBS

## 2019-02-27 DIAGNOSIS — S46.919D SHOULDER STRAIN, UNSPECIFIED LATERALITY, SUBSEQUENT ENCOUNTER: Primary | ICD-10-CM

## 2019-02-27 PROCEDURE — 99213 OFFICE O/P EST LOW 20 MIN: CPT | Performed by: FAMILY MEDICINE

## 2019-02-27 PROCEDURE — 97140 MANUAL THERAPY 1/> REGIONS: CPT | Mod: GP

## 2019-02-27 PROCEDURE — 97010 HOT OR COLD PACKS THERAPY: CPT | Mod: GP

## 2019-02-27 ASSESSMENT — PAIN SCALES - GENERAL: PAINLEVEL: EXTREME PAIN (8)

## 2019-03-03 NOTE — PROGRESS NOTES
Visit Date:   2019      SUBJECTIVE:  Recheck left shoulder injury, Workers Compensation.  She is about 2 weeks out from her injury now, has been going to physical therapy, has made a little bit of progress, but still is having discomfort.  Physical therapy seems to have intermittently set her back slightly due to the stress that is placed as far as motion and strengthening.  She is optimistic, however, that things are gradually improving.  She is on limited lifting restrictions at her work.      PHYSICAL EXAMINATION:   GENERAL:  Exam shows her in no distress.   VITTAL SIGNS:  Normal with a blood pressure 120/88, pulse 80, respirations 18, temperature 96.4.  MUSCULOSKEKETAL:  Her left shoulder range of motion is somewhat diminished, and at extremes, she relates some discomfort.  She has a little bit of radiation up into her lateral neck.  Her strength appears to be reasonable though not fully tested due to worry about exacerbation of her pain.  There is no obvious muscle atrophy.      IMPRESSION:  Continuing symptoms of a musculoskeletal nature due to repetitive/overuse situation at work.      PLAN:  At this point, we will continue her physical therapy.  She has home exercises to do.  She is going to be vacationing for a period of 2 weeks or so, giving the injury a chance to heal further.  I recommended, though, that we continue with her restrictions as at present and reassess in about 2-3 weeks.         CHUCK ADAMS MD             D: 2019   T: 2019   MT: BLAIR      Name:     KOKO MOONEY   MRN:      [id number]        Account:      [id number]   :      1975           Visit Date:   2019      Document: [id number]

## 2019-03-21 ENCOUNTER — HOSPITAL ENCOUNTER (OUTPATIENT)
Dept: PHYSICAL THERAPY | Facility: CLINIC | Age: 44
Setting detail: THERAPIES SERIES
End: 2019-03-21
Attending: FAMILY MEDICINE
Payer: OTHER MISCELLANEOUS

## 2019-03-21 PROCEDURE — 97110 THERAPEUTIC EXERCISES: CPT | Mod: GP

## 2019-03-27 ENCOUNTER — OFFICE VISIT (OUTPATIENT)
Dept: FAMILY MEDICINE | Facility: CLINIC | Age: 44
End: 2019-03-27
Payer: OTHER MISCELLANEOUS

## 2019-03-27 VITALS
DIASTOLIC BLOOD PRESSURE: 72 MMHG | WEIGHT: 166.6 LBS | SYSTOLIC BLOOD PRESSURE: 110 MMHG | HEART RATE: 96 BPM | TEMPERATURE: 98.9 F | BODY MASS INDEX: 26.49 KG/M2 | RESPIRATION RATE: 16 BRPM

## 2019-03-27 DIAGNOSIS — S49.90XD SHOULDER INJURY, UNSPECIFIED LATERALITY, SUBSEQUENT ENCOUNTER: Primary | ICD-10-CM

## 2019-03-27 PROCEDURE — 99213 OFFICE O/P EST LOW 20 MIN: CPT | Performed by: FAMILY MEDICINE

## 2019-03-27 ASSESSMENT — PAIN SCALES - GENERAL: PAINLEVEL: MODERATE PAIN (4)

## 2019-03-27 NOTE — PROGRESS NOTES
SUBJECTIVE:   Nilam Orozco is a 43 year old female who presents to clinic today for the following health issues:  Chief Complaint   Patient presents with     Shoulder left     pt c/o left shoulder pain, WORK COMP

## 2019-03-28 ENCOUNTER — TELEPHONE (OUTPATIENT)
Dept: FAMILY MEDICINE | Facility: OTHER | Age: 44
End: 2019-03-28

## 2019-03-31 NOTE — PROGRESS NOTES
Visit Date:   2019      SUBJECTIVE:  Recheck left shoulder overuse syndrome.  She has been going to physical therapy and has been complying with her work restrictions.  She has noted gradual improvement but still significant pain and loss of especially posterior adduction motion due to pain.  She has not noted that there is any redness or swelling or numbness in her arm distally.      OBJECTIVE: Exam shows her in no distress     VITAL SIGNS all normal.  Range of motion of the shoulder is slightly diminished posterior adduction is not tested though due to pain.  There appears to be no atrophy evident.      IMPRESSION:  Slow improvement of shoulder overuse syndrome.      PLAN:  I recommended continuing with the same work restrictions and physical therapy program.  She is to return in 2-3 weeks for followup. If she continues to slowly improve, we would consider orthopedic consultation.         CHUCK ADAMS MD             D: 2019   T: 2019   MT: AS      Name:     KOKO MOONEY   MRN:      [id number]        Account:      [id number]   :      1975           Visit Date:   2019      Document: [id number]

## 2019-04-05 ENCOUNTER — HOSPITAL ENCOUNTER (OUTPATIENT)
Dept: PHYSICAL THERAPY | Facility: CLINIC | Age: 44
Setting detail: THERAPIES SERIES
End: 2019-04-05
Attending: FAMILY MEDICINE
Payer: OTHER MISCELLANEOUS

## 2019-04-05 PROCEDURE — 97110 THERAPEUTIC EXERCISES: CPT | Mod: GP

## 2019-04-05 PROCEDURE — 97140 MANUAL THERAPY 1/> REGIONS: CPT | Mod: GP

## 2019-04-16 ENCOUNTER — HOSPITAL ENCOUNTER (OUTPATIENT)
Dept: PHYSICAL THERAPY | Facility: CLINIC | Age: 44
Setting detail: THERAPIES SERIES
End: 2019-04-16
Attending: FAMILY MEDICINE
Payer: OTHER MISCELLANEOUS

## 2019-04-16 PROCEDURE — 97110 THERAPEUTIC EXERCISES: CPT | Mod: GP

## 2019-04-16 NOTE — PROGRESS NOTES
"Outpatient Physical Therapy Progress Note     Patient: Nilam Orozco  : 1975    Beginning/End Dates of Reporting Period:  2019 to 2019    Referring Provider: Dr. Cesar Thompson MD    Therapy Diagnosis: Left shoulder pain and tightness     Client Self Report: Patient reports that her shoulder still aches and her upper trap is still tight. She has limited change in her shoulder, however she is still doing her lifting at work as long as it's not above her head. The more she lifts and carries the worst her shoulder gets. She expects to get sore tomorrow.     Objective Measurements:                                          Outcome Measures (most recent score):  SPADI: 55    Goals:  Goal Identifier SPADI   Goal Description Patient will reduce score on spadi to 5 or less to demonstrate improvement in overall shoulder function.   Target Date 19   Date Met      Progress: Patient scored a 55 on the SPADI.     Goal Identifier Lifting   Goal Description Patient will be able to lift 5 lbs with her left UE above 90 degrees elevation without pain or limitation.   Target Date 19   Date Met      Progress: Patient no longer lifts above 90 degrees at this time to prevent increased symptoms.      Goal Identifier AROM   Goal Description Patient will have full and painfree cervical and left upper extremity without pain or limitation   Target Date 19   Date Met      Progress: Patient has a tight left upper trap resulting in slightly limited right side bending resulting in a mild pain described as \"a tight rubber band\" in her upper trap.       Progress Toward Goals:   Progress this reporting period: Patient has limited progress in terms of tolerance at work, symptoms and overall functional performance, however the patient is now able to actively reproduce her pain or eliminate her pain based on scapular motions.           Plan:  Continue therapy per current plan of care.    Discharge:  No  "

## 2019-04-24 ENCOUNTER — ANCILLARY PROCEDURE (OUTPATIENT)
Dept: GENERAL RADIOLOGY | Facility: CLINIC | Age: 44
End: 2019-04-24
Attending: ORTHOPAEDIC SURGERY
Payer: OTHER MISCELLANEOUS

## 2019-04-24 ENCOUNTER — OFFICE VISIT (OUTPATIENT)
Dept: ORTHOPEDICS | Facility: CLINIC | Age: 44
End: 2019-04-24
Attending: FAMILY MEDICINE
Payer: OTHER MISCELLANEOUS

## 2019-04-24 VITALS
BODY MASS INDEX: 25.35 KG/M2 | HEIGHT: 67 IN | DIASTOLIC BLOOD PRESSURE: 76 MMHG | SYSTOLIC BLOOD PRESSURE: 115 MMHG | WEIGHT: 161.5 LBS

## 2019-04-24 DIAGNOSIS — M25.512 LEFT SHOULDER PAIN: ICD-10-CM

## 2019-04-24 DIAGNOSIS — S49.92XA INJURY OF LEFT SHOULDER, INITIAL ENCOUNTER: Primary | ICD-10-CM

## 2019-04-24 DIAGNOSIS — M25.512 ARTHRALGIA OF LEFT ACROMIOCLAVICULAR JOINT: ICD-10-CM

## 2019-04-24 PROCEDURE — 99243 OFF/OP CNSLTJ NEW/EST LOW 30: CPT | Performed by: ORTHOPAEDIC SURGERY

## 2019-04-24 PROCEDURE — 73030 X-RAY EXAM OF SHOULDER: CPT | Mod: TC

## 2019-04-24 ASSESSMENT — MIFFLIN-ST. JEOR: SCORE: 1402.25

## 2019-04-24 ASSESSMENT — PAIN SCALES - GENERAL: PAINLEVEL: MODERATE PAIN (5)

## 2019-04-24 NOTE — LETTER
4/24/2019         RE: Nilam Orozco  [address] [facility] [address]        Dear Colleague,    Thank you for referring your patient, Nilam Orozco, to the [facility]. Please see a copy of my visit note below.    ORTHOPEDIC CONSULT      Chief Complaint: Nilam Orozco is a 43 year old female who is being seen for   Chief Complaint   Patient presents with     Shoulder Pain     left shoulder pain     Consult     ref: Dr. Thompson       History of Present Illness:   Nilam Orozco is a 43 year old female who is seen in consultation at the request of Dr. Thompson for evaluation of left shoulder pain. This is Workman's comp claim.   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  She noticed when she was lifting had some pain to the left shoulder, then got worse throughout the day. Since then has been using icing, resting, activity modification, ibuprofen all with some limited pain improvement.  The pain is mostly to the AC joint, trapezius, superior shoulder.  Sharp pulling pain  Worse with lifting above the head, worse with repetitive motion, especially bad with internal rotation.    Duration of Pain:  10 weeks ago  Rating of Pain:  Mild to moderate depending on activity.    Pain is better with: rest, icing, ibuprofen, activity modification, lifting restrictions.    Treatment so far consists of: physical therapy with some improved motion but no improved strength or pain. Rest, activity modification, NSAIDs, icing, time.   Associated Features: weakness with lifting. No numbness/tingling.  Prior history of related problems:   No previous shoulder issues prior to this. Normally very healthy and active.       Patient's past medical, surgical, social and family histories reviewed.     Past Medical History:   Diagnosis Date     Lipoma     multiple on legs       Past Surgical History:   Procedure Laterality Date      "SURGICAL PATHOLOGY EXAM      multiple       Medications:    No current outpatient medications on file prior to visit.  No current facility-administered medications on file prior to visit.     Allergies   Allergen Reactions     Codeine        Social History     Occupational History     Comment: [employer]   Tobacco Use     Smoking status: Former Smoker     Types: Cigarettes     Smokeless tobacco: Never Used   Substance and Sexual Activity     Alcohol use: Yes     Alcohol/week: 0.0 oz     Drug use: No     Sexual activity: Yes     Partners: Male     Birth control/protection: Male Surgical     Comment: vas       Family History   Problem Relation Age of Onset     Coronary Artery Disease Mother      Aneurysm Mother      Other Cancer Maternal Grandmother         cervical     Mental Illness Paternal Grandfather         alzheimers     Cancer Nephew      Breast Cancer Paternal Aunt        REVIEW OF SYSTEMS  10 point review systems performed otherwise negative as noted as per history of present illness.    Physical Exam:  Vitals: Ht 1.689 m (5' 6.5\")   Wt 73.3 kg (161 lb 8 oz)   LMP 06/01/2016   BMI 25.68 kg/m     BMI= Body mass index is 25.68 kg/m .  Constitutional: healthy, alert and no acute distress   Psychiatric: mentation appears normal and affect normal/bright  NEURO: no focal deficits  RESP: Normal with easy respirations and no use of accessory muscles to breathe, no audible wheezing or retractions  CV: LUE:   no edema         Regular rate and rhythm by palpation  SKIN: No erythema, rashes, excoriation, or breakdown. No evidence of infection.   JOINT/EXTREMITIES:left shoulder: no effusion,. Swelling, deformity.  No atrophy. AROM 180/180/60/back pocket. Pain with IR. Focally tender at AC joint. No other focal or bony tenderness.  Mild weakness but no pain with supraspinatus. +Mayers's. +Cross body.  Negative White.  +Weakness and pain including lift off with subscapularis testing. No pain or weakness with biceps and " infraspinatus testing.  Negative speed's.  Negative empty can test.  Distal neurovascular grossly intact.    Lymph: no appreciated lymphedema  GAIT: not tested     Diagnostic Modalities:  left shoulder X-ray: Well preserved glenohumeral articular space. No fracture, dislocation and or lesion.   Independent visualization of the images was performed.      Impression:left shoulder injury/pain with rotator cuff weakness and pain   AC joint pain    Plan:  All of the above pertinent physical exam and imaging modalities findings was reviewed with Nilam.  10 weeks ago had pain while lifting. Despite conservative care has not been improving. Does have some subscapularis provacative findings. Does have some findings consistent with AC joint pain, however given the weakness and pain with subscapularis testing Recommended MRI to rule out RTC tear and return for results and further plan.  Also recommended no overhead lifting, no push, pull, lift > 5 pounds with the left arm.  Letter provided.     Return to clinic to discuss test results, or sooner as needed for changes.  Re-x-ray on return: No    Scribed by:  WINDY Bowling, CNP  7:30 AM  4/24/2019    I attest I have seen and evaluated the patient.  I agree with above impression and plan.    Mario Julio D.O.    Again, thank you for allowing me to participate in the care of your patient.        Sincerely,        Robbie Julio, DO

## 2019-04-24 NOTE — PROGRESS NOTES
ORTHOPEDIC CONSULT      Chief Complaint: Nilam Orozco is a 43 year old female who is being seen for   Chief Complaint   Patient presents with     Shoulder Pain     left shoulder pain     Consult     ref: Dr. Thompson       History of Present Illness:   Nilam Orozco is a 43 year old female who is seen in consultation at the request of Dr. Thompson for evaluation of left shoulder pain. This is Workman's comp claim.   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  She noticed when she was lifting had some pain to the left shoulder, then got worse throughout the day. Since then has been using icing, resting, activity modification, ibuprofen all with some limited pain improvement.  The pain is mostly to the AC joint, trapezius, superior shoulder.  Sharp pulling pain  Worse with lifting above the head, worse with repetitive motion, especially bad with internal rotation.    Duration of Pain:  10 weeks ago  Rating of Pain:  Mild to moderate depending on activity.    Pain is better with: rest, icing, ibuprofen, activity modification, lifting restrictions.    Treatment so far consists of: physical therapy with some improved motion but no improved strength or pain. Rest, activity modification, NSAIDs, icing, time.   Associated Features: weakness with lifting. No numbness/tingling.  Prior history of related problems:   No previous shoulder issues prior to this. Normally very healthy and active.       Patient's past medical, surgical, social and family histories reviewed.     Past Medical History:   Diagnosis Date     Lipoma     multiple on legs       Past Surgical History:   Procedure Laterality Date     SURGICAL PATHOLOGY EXAM      multiple       Medications:    No current outpatient medications on file prior to visit.  No current facility-administered medications on file prior to visit.     Allergies   Allergen Reactions     Codeine   "      Social History     Occupational History     Comment: [employer]   Tobacco Use     Smoking status: Former Smoker     Types: Cigarettes     Smokeless tobacco: Never Used   Substance and Sexual Activity     Alcohol use: Yes     Alcohol/week: 0.0 oz     Drug use: No     Sexual activity: Yes     Partners: Male     Birth control/protection: Male Surgical     Comment: vas       Family History   Problem Relation Age of Onset     Coronary Artery Disease Mother      Aneurysm Mother      Other Cancer Maternal Grandmother         cervical     Mental Illness Paternal Grandfather         alzheimers     Cancer Nephew      Breast Cancer Paternal Aunt        REVIEW OF SYSTEMS  10 point review systems performed otherwise negative as noted as per history of present illness.    Physical Exam:  Vitals: Ht 1.689 m (5' 6.5\")   Wt 73.3 kg (161 lb 8 oz)   LMP 06/01/2016   BMI 25.68 kg/m    BMI= Body mass index is 25.68 kg/m .  Constitutional: healthy, alert and no acute distress   Psychiatric: mentation appears normal and affect normal/bright  NEURO: no focal deficits  RESP: Normal with easy respirations and no use of accessory muscles to breathe, no audible wheezing or retractions  CV: LUE:   no edema         Regular rate and rhythm by palpation  SKIN: No erythema, rashes, excoriation, or breakdown. No evidence of infection.   JOINT/EXTREMITIES:left shoulder: no effusion,. Swelling, deformity.  No atrophy. AROM 180/180/60/back pocket. Pain with IR. Focally tender at AC joint. No other focal or bony tenderness.  Mild weakness but no pain with supraspinatus. +Mayers's. +Cross body.  Negative White.  +Weakness and pain including lift off with subscapularis testing. No pain or weakness with biceps and infraspinatus testing.  Negative speed's.  Negative empty can test.  Distal neurovascular grossly intact.    Lymph: no appreciated lymphedema  GAIT: not tested     Diagnostic Modalities:  left shoulder X-ray: Well preserved glenohumeral " articular space. No fracture, dislocation and or lesion.   Independent visualization of the images was performed.      Impression:left shoulder injury/pain with rotator cuff weakness and pain   AC joint pain    Plan:  All of the above pertinent physical exam and imaging modalities findings was reviewed with Nilam.  10 weeks ago had pain while lifting. Despite conservative care has not been improving. Does have some subscapularis provacative findings. Does have some findings consistent with AC joint pain, however given the weakness and pain with subscapularis testing Recommended MRI to rule out RTC tear and return for results and further plan.  Also recommended no overhead lifting, no push, pull, lift > 5 pounds with the left arm.  Letter provided.     Return to clinic to discuss test results, or sooner as needed for changes.  Re-x-ray on return: No    Scribed by:  WINDY Bowling, CNP  7:30 AM  4/24/2019    I attest I have seen and evaluated the patient.  I agree with above impression and plan.    Mario Julio D.O.

## 2019-04-24 NOTE — LETTER
[address] [address]  Phone: [phone]  Fax: [phone]    April 24, 2019        Nilam Orozco  [address] [facility] [address]          To whom it may concern:    RE: Nilam Orozco    Patient was seen and treated today at our clinic.  Patient may return to work  with the following:  Left upper extremity- no overhead use. No lift, push or pull greater than 5lbs until follow up.    Please contact me for questions or concerns.      Sincerely,        Robbie Julio, DO

## 2019-05-08 ENCOUNTER — HOSPITAL ENCOUNTER (OUTPATIENT)
Dept: MRI IMAGING | Facility: CLINIC | Age: 44
Discharge: HOME | End: 2019-05-08
Attending: NURSE PRACTITIONER | Admitting: NURSE PRACTITIONER
Payer: OTHER MISCELLANEOUS

## 2019-05-08 DIAGNOSIS — S49.92XA INJURY OF LEFT SHOULDER, INITIAL ENCOUNTER: ICD-10-CM

## 2019-05-08 PROCEDURE — 73221 MRI JOINT UPR EXTREM W/O DYE: CPT | Mod: LT

## 2019-05-15 ENCOUNTER — OFFICE VISIT (OUTPATIENT)
Dept: ORTHOPEDICS | Facility: CLINIC | Age: 44
End: 2019-05-15
Payer: OTHER MISCELLANEOUS

## 2019-05-15 VITALS
WEIGHT: 161 LBS | BODY MASS INDEX: 25.27 KG/M2 | HEIGHT: 67 IN | DIASTOLIC BLOOD PRESSURE: 78 MMHG | SYSTOLIC BLOOD PRESSURE: 130 MMHG

## 2019-05-15 DIAGNOSIS — M19.012 ARTHRITIS OF LEFT ACROMIOCLAVICULAR JOINT: Primary | ICD-10-CM

## 2019-05-15 PROCEDURE — 99213 OFFICE O/P EST LOW 20 MIN: CPT | Mod: 25 | Performed by: ORTHOPAEDIC SURGERY

## 2019-05-15 PROCEDURE — 20605 DRAIN/INJ JOINT/BURSA W/O US: CPT | Mod: LT | Performed by: ORTHOPAEDIC SURGERY

## 2019-05-15 RX ORDER — TRIAMCINOLONE ACETONIDE 40 MG/ML
40 INJECTION, SUSPENSION INTRA-ARTICULAR; INTRAMUSCULAR ONCE
Status: COMPLETED | OUTPATIENT
Start: 2019-05-15 | End: 2019-05-15

## 2019-05-15 RX ADMIN — TRIAMCINOLONE ACETONIDE 40 MG: 40 INJECTION, SUSPENSION INTRA-ARTICULAR; INTRAMUSCULAR at 11:33

## 2019-05-15 ASSESSMENT — MIFFLIN-ST. JEOR: SCORE: 1399.98

## 2019-05-15 ASSESSMENT — PAIN SCALES - GENERAL: PAINLEVEL: NO PAIN (0)

## 2019-05-15 NOTE — LETTER
5/15/2019         RE: Nilam Orozco  [address] [facility] [address]        Dear Colleague,    Thank you for referring your patient, Nilam Orozco, to the [facility]. Please see a copy of my visit note below.    Prior to injection, verified patient identity using patient's name and date of birth.  Due to injection administration, patient instructed to remain in clinic for 15 minutes  afterwards, and to report any adverse reaction to me immediately.    Joint injection was performed.      Drug Amount Wasted:  None.  Vial/Syringe: Single dose vial  Expiration Date:  9/2020    The following medication was given by Kasey Fernandez PA-C  MEDICATION: Kenalog 40mg/1ml  ROUTE: Joint Injection  SITE: left shoulder  DOSE: 1 mL  LOT #: [id number]  : [email or web address]  EXPIRATION DATE:  9/2020  NDC: [address]-8                      Office Visit-Follow up    Chief Complaint: Nilam Orozco is a 43 year old female who is being seen for   Chief Complaint   Patient presents with     RECHECK     MRI results W/C       History of Present Illness:   Today's visit:  Turns for MRI results.  Complains of tightness but will have pain at night when she lays on it.  Pain with reaching over her body.    April 24, 2018 visit:  Nilam Orozco is a 43 year old female who is seen in consultation at the request of Dr. Thompson for evaluation of left shoulder pain. This is Workman's comp claim.   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  She noticed when she was lifting had some pain to the left shoulder, then got worse throughout the day. Since then has been using icing, resting, activity modification, ibuprofen all with some limited pain improvement.  The pain is mostly to the AC joint, trapezius, superior shoulder.  Sharp pulling pain  Worse with lifting above the head, worse with repetitive motion, especially bad with  "internal rotation.    Duration of Pain:  10 weeks ago  Rating of Pain:  Mild to moderate depending on activity.    Pain is better with: rest, icing, ibuprofen, activity modification, lifting restrictions.    Treatment so far consists of: physical therapy with some improved motion but no improved strength or pain. Rest, activity modification, NSAIDs, icing, time.   Associated Features: weakness with lifting. No numbness/tingling.  Prior history of related problems:   No previous shoulder issues prior to this. Normally very healthy and active.      REVIEW OF SYSTEMS  General: negative for, night sweats, dizziness, fatigue  Resp: No shortness of breath and no cough  CV: negative for chest pain, syncope or near-syncope  GI: negative for nausea, vomiting and diarrhea  : negative for dysuria and hematuria  Musculoskeletal: as above  Neurologic: negative for syncope   Hematologic: negative for bleeding disorder    Physical Exam:  Vitals: /78   Ht 1.689 m (5' 6.5\")   Wt 73 kg (161 lb)   LMP 06/01/2016   BMI 25.60 kg/m     BMI= Body mass index is 25.6 kg/m .  Constitutional: healthy, alert and no acute distress   Psychiatric: mentation appears normal and affect normal/bright  NEURO: no focal deficits  RESP: Normal with easy respirations and no use of accessory muscles to breathe, no audible wheezing or retractions  CV: LUE:   no edema           SKIN: No erythema, rashes, excoriation, or breakdown. No evidence of infection.   JOINT/EXTREMITIES:left shoulder: No gross deformity.  No evidence of infection.  There is some tenderness of the AC joint.  GAIT: not tested             Diagnostic Modalities:  left shoulder MRI: No fractures or dislocations.  Mild tendinosis of the supraspinatus tendon.  No rotator cuff tendon tear.  Glenohumeral cartilage is intact.  Biceps tendon located with no subluxation.  Mild degenerative changes at the AC joint  Independent visualization of the images was performed.      Impression: " left shoulder acromial clavicular joint arthritis     Plan:  All of the above pertinent physical exam and imaging modalities findings was reviewed with Nilam.                                          INJECTION PROCEDURE:  The patient was counseled about an  injection, including discussion of risks (including infection), contents of the injection, rationale for performing the injection, and expected benefits of the injection. The skin was prepped with alcohol and betadine and then utilizing sterile technique an injection of the left shoulder AC joint was performed. The injection consisted 1ml of Kenalog (40mg per 1 ml) mixed with 3ml of 0.5% Marcaine. The patient tolerated the injection well, and there were no complications. The injection site was covered with a Band-Aid. The injection was performed by EMILIE Hopper.     Options discussed.  MRI essentially unremarkable as far as the rotator cuff tendon.  Recommend the injection as her exam seems to localize out to the AC joint which is consistent with the degenerative changes.  Recommend she continue physical therapy.  Continue work restrictions plan to see her back in 1 month.      Return to clinic 4, week(s), or sooner as needed for changes.  Re-x-ray on return: No    Mario Julio D.O.          Again, thank you for allowing me to participate in the care of your patient.        Sincerely,        Robbie Julio, DO

## 2019-05-15 NOTE — LETTER
[address] [address]  Phone: [phone]  Fax: [phone]  5/15/2019         Nilam Orozco  [address] [facility] [address]          To whom it may concern:    RE: Nilam Orozco    Patient was seen and treated today at our clinic.  Patient may return to work  with the following:  Left upper extremity- no overhead use. No lift, push or pull greater than 5lbs until follow up.    Please contact me for questions or concerns.      Sincerely,        Robbie Julio, DO

## 2019-05-15 NOTE — PROGRESS NOTES
Prior to injection, verified patient identity using patient's name and date of birth.  Due to injection administration, patient instructed to remain in clinic for 15 minutes  afterwards, and to report any adverse reaction to me immediately.    Joint injection was performed.      Drug Amount Wasted:  None.  Vial/Syringe: Single dose vial  Expiration Date:  9/2020    The following medication was given by Kasey Fernandez PA-C  MEDICATION: Kenalog 40mg/1ml  ROUTE: Joint Injection  SITE: left shoulder  DOSE: 1 mL  LOT #: [id number]  : [employer]  EXPIRATION DATE:  9/2020  NDC: [address]-7

## 2019-06-20 ENCOUNTER — HOSPITAL ENCOUNTER (OUTPATIENT)
Dept: PHYSICAL THERAPY | Facility: CLINIC | Age: 44
Setting detail: THERAPIES SERIES
End: 2019-06-20
Attending: FAMILY MEDICINE
Payer: OTHER MISCELLANEOUS

## 2019-06-20 PROCEDURE — 97140 MANUAL THERAPY 1/> REGIONS: CPT | Mod: GP

## 2019-06-20 PROCEDURE — 97110 THERAPEUTIC EXERCISES: CPT | Mod: GP

## 2019-06-25 ENCOUNTER — HOSPITAL ENCOUNTER (OUTPATIENT)
Dept: PHYSICAL THERAPY | Facility: CLINIC | Age: 44
Setting detail: THERAPIES SERIES
End: 2019-06-25
Attending: FAMILY MEDICINE
Payer: OTHER MISCELLANEOUS

## 2019-06-25 PROCEDURE — 97110 THERAPEUTIC EXERCISES: CPT | Mod: GP

## 2019-07-24 ENCOUNTER — HOSPITAL ENCOUNTER (OUTPATIENT)
Dept: PHYSICAL THERAPY | Facility: CLINIC | Age: 44
Setting detail: THERAPIES SERIES
End: 2019-07-24
Attending: FAMILY MEDICINE
Payer: OTHER MISCELLANEOUS

## 2019-07-24 PROCEDURE — 97110 THERAPEUTIC EXERCISES: CPT | Mod: GP

## 2019-08-21 NOTE — ADDENDUM NOTE
Encounter addended by: Eric Haddad, PT on: 8/21/2019 3:20 PM   Actions taken: Flowsheet data copied forward, Sign clinical note, Flowsheet accepted, Episode resolved

## 2019-08-21 NOTE — PROGRESS NOTES
Outpatient Physical Therapy Discharge Note     Patient: Nilam Orozco  : 1975    Beginning/End Dates of Reporting Period:  2019 to 2019    Referring Provider: Cesar Thompson MD    Therapy Diagnosis: Left Shoulder Pain     Client Self Report: Patient reports her shoulder is doing quite well and her only issue is some arm tingliness/fatigue with working 64 hours a week.    Objective Measurements:  Objective Measure: MMT  Details:  and painfree  Objective Measure: AROM  Details: WNL  Objective Measure: SPADI  Details: 6                        Outcome Measures (most recent score):  SPADI: 6    Goals:  Goal Identifier (P) SPADI   Goal Description (P) Patient will reduce score on spadi to 5 or less to demonstrate improvement in overall shoulder function.   Target Date (P) 19   Date Met      Progress:     Goal Identifier Lifting   Goal Description Patient will be able to lift 5 lbs with her left UE above 90 degrees elevation without pain or limitation.   Target Date 19   Date Met  (P) 19   Progress:     Goal Identifier AROM   Goal Description Patient will have full and painfree cervical and left upper extremity without pain or limitation   Target Date 19   Date Met  19   Progress:     Goal Identifier     Goal Description     Target Date     Date Met      Progress:     Goal Identifier     Goal Description     Target Date     Date Met      Progress:     Goal Identifier     Goal Description     Target Date     Date Met      Progress:     Goal Identifier     Goal Description     Target Date     Date Met      Progress:     Goal Identifier     Goal Description     Target Date     Date Met      Progress:     Progress Toward Goals:   Progress this reporting period: Patient is able to return to work without pain or limitations and tolerates overhead lifting with weights well without complaints. The final therapy sessions were designed to increase her strength and scapular function with  strengthening, however she was unable to attend due to a schedule change.          Plan:  Discharge from therapy.    Discharge:    Reason for Discharge: Patient has met most goals and was unable to attend final therapy sessions.  Equipment Issued: None    Discharge Plan: Patient to continue home program.

## 2020-02-19 ENCOUNTER — OFFICE VISIT (OUTPATIENT)
Dept: FAMILY MEDICINE | Facility: CLINIC | Age: 45
End: 2020-02-19
Payer: COMMERCIAL

## 2020-02-19 VITALS
RESPIRATION RATE: 16 BRPM | WEIGHT: 160.7 LBS | TEMPERATURE: 96.6 F | SYSTOLIC BLOOD PRESSURE: 110 MMHG | HEIGHT: 67 IN | OXYGEN SATURATION: 100 % | BODY MASS INDEX: 25.22 KG/M2 | HEART RATE: 97 BPM | DIASTOLIC BLOOD PRESSURE: 72 MMHG

## 2020-02-19 DIAGNOSIS — L55.9 SUNBURN: Primary | ICD-10-CM

## 2020-02-19 PROCEDURE — 99213 OFFICE O/P EST LOW 20 MIN: CPT | Performed by: FAMILY MEDICINE

## 2020-02-19 RX ORDER — METHYLPREDNISOLONE 4 MG
TABLET, DOSE PACK ORAL
Qty: 21 TABLET | Refills: 0 | Status: SHIPPED | OUTPATIENT
Start: 2020-02-19 | End: 2022-01-03

## 2020-02-19 ASSESSMENT — MIFFLIN-ST. JEOR: SCORE: 1393.62

## 2020-02-19 NOTE — PROGRESS NOTES
"Subjective     Nilam Orozco is a 44 year old female who presents to clinic today for the following health issues:    HPI   Patient had Botox a week and a half ago. She then went to [facility] 5 days after that. She got a sunburn. Used an alovera mask and woke up this morning with puffy eye lids.       SUBJECTIVE:  Nilam  is a 44 year old female who presents for: [place] of her face and eyelids most likely secondary to sunburn.  She had a very severe sunburn.  When she got back she put on an aloe vera mask.  But her eyes pretty much swelled shut.  She has been using a lotion.  Has had some peeling.    Past Medical History:   Diagnosis Date     Lipoma     multiple on legs     Past Surgical History:   Procedure Laterality Date     SURGICAL PATHOLOGY EXAM      multiple     Social History     Tobacco Use     Smoking status: Former Smoker     Types: Cigarettes     Smokeless tobacco: Never Used   Substance Use Topics     Alcohol use: Yes     Alcohol/week: 0.0 standard drinks     Current Outpatient Medications   Medication Sig Dispense Refill     methylPREDNISolone 4 MG PO tablet therapy pack Follow Package Directions 21 tablet 0       REVIEW OF SYSTEMS:   5 point ROS negative except as noted above in HPI, including Gen., Resp, CV, GI &  system review.     OBJECTIVE:  Vitals: /72 (BP Location: Right arm, Patient Position: Sitting, Cuff Size: Adult Large)   Pulse 97   Temp 96.6  F (35.9  C) (Temporal)   Resp 16   Ht 1.689 m (5' 6.5\")   Wt 72.9 kg (160 lb 11.2 oz)   LMP 06/01/2016   SpO2 100%   BMI 25.55 kg/m    BMI= Body mass index is 25.55 kg/m .  Her face is reddened.  Her eyelids are little bit puffy.  Conjunctiva are normal.  PERRLA.  No blistering seen a little bit of scaling noted on the nose and upper cheeks.    ASSESSMENT:  Sunburn    PLAN:  Recommended cool compresses.  Zyrtec 10 mg twice daily.  Hypoallergenic moisturizing cream.  And she can get a Medrol Dosepak to help with some of the swelling.  " Follow-up if not improving.        Filemon Cisneros MD  [facility]

## 2021-12-10 ENCOUNTER — HOSPITAL ENCOUNTER (OUTPATIENT)
Dept: MAMMOGRAPHY | Facility: CLINIC | Age: 46
Discharge: HOME | End: 2021-12-10
Attending: PHYSICIAN ASSISTANT | Admitting: PHYSICIAN ASSISTANT
Payer: COMMERCIAL

## 2021-12-10 DIAGNOSIS — Z12.31 VISIT FOR SCREENING MAMMOGRAM: ICD-10-CM

## 2021-12-10 PROCEDURE — 77063 BREAST TOMOSYNTHESIS BI: CPT

## 2022-01-03 ENCOUNTER — OFFICE VISIT (OUTPATIENT)
Dept: FAMILY MEDICINE | Facility: CLINIC | Age: 47
End: 2022-01-03
Payer: COMMERCIAL

## 2022-01-03 VITALS
HEIGHT: 67 IN | WEIGHT: 164 LBS | BODY MASS INDEX: 25.74 KG/M2 | OXYGEN SATURATION: 99 % | TEMPERATURE: 98.2 F | RESPIRATION RATE: 14 BRPM | DIASTOLIC BLOOD PRESSURE: 80 MMHG | SYSTOLIC BLOOD PRESSURE: 124 MMHG | HEART RATE: 101 BPM

## 2022-01-03 DIAGNOSIS — D17.30 LIPOMA OF SKIN AND SUBCUTANEOUS TISSUE: ICD-10-CM

## 2022-01-03 DIAGNOSIS — R07.89 ATYPICAL CHEST PAIN: ICD-10-CM

## 2022-01-03 DIAGNOSIS — E28.319 EARLY ONSET MENOPAUSE: ICD-10-CM

## 2022-01-03 DIAGNOSIS — R68.89 CHANGE IN WEIGHT: ICD-10-CM

## 2022-01-03 DIAGNOSIS — Z00.00 ROUTINE GENERAL MEDICAL EXAMINATION AT A HEALTH CARE FACILITY: Primary | ICD-10-CM

## 2022-01-03 LAB
ALBUMIN SERPL-MCNC: 4.1 G/DL (ref 3.4–5)
ALP SERPL-CCNC: 67 U/L (ref 40–150)
ALT SERPL W P-5'-P-CCNC: 31 U/L (ref 0–50)
ANION GAP SERPL CALCULATED.3IONS-SCNC: 6 MMOL/L (ref 3–14)
AST SERPL W P-5'-P-CCNC: 16 U/L (ref 0–45)
BASOPHILS # BLD AUTO: 0 10E3/UL (ref 0–0.2)
BASOPHILS NFR BLD AUTO: 1 %
BILIRUB SERPL-MCNC: 0.4 MG/DL (ref 0.2–1.3)
BUN SERPL-MCNC: 16 MG/DL (ref 7–30)
CALCIUM SERPL-MCNC: 9 MG/DL (ref 8.5–10.1)
CHLORIDE BLD-SCNC: 109 MMOL/L (ref 94–109)
CHOLEST SERPL-MCNC: 173 MG/DL
CO2 SERPL-SCNC: 26 MMOL/L (ref 20–32)
CREAT SERPL-MCNC: 0.64 MG/DL (ref 0.52–1.04)
EOSINOPHIL # BLD AUTO: 0.3 10E3/UL (ref 0–0.7)
EOSINOPHIL NFR BLD AUTO: 4 %
ERYTHROCYTE [DISTWIDTH] IN BLOOD BY AUTOMATED COUNT: 12.5 % (ref 10–15)
FASTING STATUS PATIENT QL REPORTED: YES
GFR SERPL CREATININE-BSD FRML MDRD: >90 ML/MIN/1.73M2
GLUCOSE BLD-MCNC: 94 MG/DL (ref 70–99)
HCT VFR BLD AUTO: 38.8 % (ref 35–47)
HDLC SERPL-MCNC: 79 MG/DL
HGB BLD-MCNC: 13.2 G/DL (ref 11.7–15.7)
IMM GRANULOCYTES # BLD: 0 10E3/UL
IMM GRANULOCYTES NFR BLD: 1 %
LDLC SERPL CALC-MCNC: 69 MG/DL
LYMPHOCYTES # BLD AUTO: 1.1 10E3/UL (ref 0.8–5.3)
LYMPHOCYTES NFR BLD AUTO: 17 %
MCH RBC QN AUTO: 29.8 PG (ref 26.5–33)
MCHC RBC AUTO-ENTMCNC: 34 G/DL (ref 31.5–36.5)
MCV RBC AUTO: 88 FL (ref 78–100)
MONOCYTES # BLD AUTO: 0.3 10E3/UL (ref 0–1.3)
MONOCYTES NFR BLD AUTO: 5 %
NEUTROPHILS # BLD AUTO: 4.7 10E3/UL (ref 1.6–8.3)
NEUTROPHILS NFR BLD AUTO: 72 %
NONHDLC SERPL-MCNC: 94 MG/DL
NRBC # BLD AUTO: 0 10E3/UL
NRBC BLD AUTO-RTO: 0 /100
PLATELET # BLD AUTO: 287 10E3/UL (ref 150–450)
POTASSIUM BLD-SCNC: 4 MMOL/L (ref 3.4–5.3)
PROT SERPL-MCNC: 7.4 G/DL (ref 6.8–8.8)
RBC # BLD AUTO: 4.43 10E6/UL (ref 3.8–5.2)
SODIUM SERPL-SCNC: 141 MMOL/L (ref 133–144)
TRIGL SERPL-MCNC: 125 MG/DL
TSH SERPL DL<=0.005 MIU/L-ACNC: 1.31 MU/L (ref 0.4–4)
WBC # BLD AUTO: 6.4 10E3/UL (ref 4–11)

## 2022-01-03 PROCEDURE — G0145 SCR C/V CYTO,THINLAYER,RESCR: HCPCS | Performed by: PHYSICIAN ASSISTANT

## 2022-01-03 PROCEDURE — 80050 GENERAL HEALTH PANEL: CPT | Performed by: PHYSICIAN ASSISTANT

## 2022-01-03 PROCEDURE — 93000 ELECTROCARDIOGRAM COMPLETE: CPT | Performed by: PHYSICIAN ASSISTANT

## 2022-01-03 PROCEDURE — 80061 LIPID PANEL: CPT | Performed by: PHYSICIAN ASSISTANT

## 2022-01-03 PROCEDURE — 99213 OFFICE O/P EST LOW 20 MIN: CPT | Mod: 25 | Performed by: PHYSICIAN ASSISTANT

## 2022-01-03 PROCEDURE — 99396 PREV VISIT EST AGE 40-64: CPT | Performed by: PHYSICIAN ASSISTANT

## 2022-01-03 PROCEDURE — 36415 COLL VENOUS BLD VENIPUNCTURE: CPT | Performed by: PHYSICIAN ASSISTANT

## 2022-01-03 PROCEDURE — 87624 HPV HI-RISK TYP POOLED RSLT: CPT | Performed by: PHYSICIAN ASSISTANT

## 2022-01-03 ASSESSMENT — ENCOUNTER SYMPTOMS
BREAST MASS: 0
HEARTBURN: 0
HEMATURIA: 0
NAUSEA: 0
MYALGIAS: 1
SHORTNESS OF BREATH: 0
DIZZINESS: 0
SORE THROAT: 0
DIARRHEA: 0
PARESTHESIAS: 0
ABDOMINAL PAIN: 0
JOINT SWELLING: 0
FEVER: 0
CONSTIPATION: 1
WEAKNESS: 0
ARTHRALGIAS: 0
FREQUENCY: 0
CHILLS: 1
DYSURIA: 0
COUGH: 0
HEMATOCHEZIA: 0
HEADACHES: 1
EYE PAIN: 0
NERVOUS/ANXIOUS: 0
PALPITATIONS: 1

## 2022-01-03 ASSESSMENT — MIFFLIN-ST. JEOR: SCORE: 1399.14

## 2022-01-03 NOTE — PROGRESS NOTES
SUBJECTIVE:   CC: Nilam Orozco is an 46 year old woman who presents for preventive health visit.       Patient has been advised of split billing requirements and indicates understanding: Yes  Healthy Habits:     Getting at least 3 servings of Calcium per day:  NO    Bi-annual eye exam:  NO    Dental care twice a year:  Yes    Sleep apnea or symptoms of sleep apnea:  Daytime drowsiness    Diet:  Breakfast skipped    Frequency of exercise:  2-3 days/week    Duration of exercise:  15-30 minutes    Taking medications regularly:  Yes    Medication side effects:  Other    PHQ-2 Total Score: 0    Additional concerns today:  Yes (WEIGHT GAIN, SINCE COVID VAC FEELS LIKE HEART SKIPPING BEATS, FATIGUE AND NOT SLEEPING WELL)      (E28.319) Early onset menopause  (R68.89) Change in weight  Patient reports LMP at age 39. She says that she has a family history of early onset menopause. Since the LMP she has experienced night sweats/hot flashes off/on. She also feels that she has been gaining and losing weight throughout the years. She is quite concerned about her weight today as she says that she is trying to eat small portions and stay active. Review of vitals shows that she has been within 6lbs +/- over the past 2 years. Patient also notes a history of cervical cancer in maternal grandmother and breast cancer in paternal aunt.    (R07.89) Atypical chest pain  Comment: Patient feels that since receiving the covid vaccine in August 2021 she has experienced odd sensations in her chest off/on. These seem to only occur at rest and in the evenings. She denies chest pain/pressure, nausea or SOB while active at work at [employer]. The odd sensations in her chest do not cause pain, but do cause discomfort and make her worry. Previous history of smoking. EKG completed today.        (D17.30) Lipoma of skin and subcutaneous tissue  Comment: She has a history of lipomas which were removed over the years. She has newly developed lipomas over  her lower extremities and over the left upper extremity. She would like to meet with a surgeon to discuss removal. Patient says that the lipomas rub/cause irritation while she is working. She also feels that since they have developed she experiences more cramping in the lower legs. She is quite concerned about scarring.     Today's PHQ-2 Score:   PHQ-2 ( 1999 Pfizer) 1/3/2022   Q1: Little interest or pleasure in doing things 0   Q2: Feeling down, depressed or hopeless 0   PHQ-2 Score 0   PHQ-2 Total Score (12-17 Years)- Positive if 3 or more points; Administer PHQ-A if positive -   Q1: Little interest or pleasure in doing things Not at all   Q2: Feeling down, depressed or hopeless Not at all   PHQ-2 Score 0       Abuse: Current or Past (Physical, Sexual or Emotional) - No  Do you feel safe in your environment? Yes    Have you ever done Advance Care Planning? (For example, a Health Directive, POLST, or a discussion with a medical provider or your loved ones about your wishes): No, advance care planning information given to patient to review.  Patient declined advance care planning discussion at this time.    Social History     Tobacco Use     Smoking status: Former Smoker     Types: Cigarettes     Smokeless tobacco: Never Used   Substance Use Topics     Alcohol use: Yes     Alcohol/week: 0.0 standard drinks         Alcohol Use 1/3/2022   Prescreen: >3 drinks/day or >7 drinks/week? No   Prescreen: >3 drinks/day or >7 drinks/week? -       Reviewed orders with patient.  Reviewed health maintenance and updated orders accordingly - Yes  Breast Cancer Screening:    FHS-7:   Breast CA Risk Assessment (FHS-7) 12/10/2021 1/3/2022   Did any of your first-degree relatives have breast or ovarian cancer? No Yes   Did any of your relatives have bilateral breast cancer? No Unknown   Did any man in your family have breast cancer? No No   Did any woman in your family have breast and ovarian cancer? No Yes   Did any woman in your  family have breast cancer before age 50 y? No No   Do you have 2 or more relatives with breast and/or ovarian cancer? No Yes   Do you have 2 or more relatives with breast and/or bowel cancer? No Unknown       Mammogram Screening: Recommended annual mammography  Pertinent mammograms are reviewed under the imaging tab.    History of abnormal Pap smear: NO - age 30- 65 PAP every 3 years recommended  PAP / HPV Latest Ref Rng & Units 1/4/2017   PAP (Historical) - NIL   HPV16 NEG Negative   HPV18 NEG Negative   HRHPV NEG Negative     Reviewed and updated as needed this visit by clinical staff  Tobacco  Allergies  Meds   Med Hx  Surg Hx  Fam Hx  Soc Hx       Reviewed and updated as needed this visit by Provider               Review of Systems   ROS: 10 point ROS neg other than the symptoms noted above in the HPI.       OBJECTIVE:   LMP 06/01/2016   Physical Exam  GENERAL APPEARANCE: healthy, alert and no distress  EYES: Eyes grossly normal to inspection, PERRL and conjunctivae and sclerae normal  HENT: ear canals and TM's normal, nose and mouth without ulcers or lesions, oropharynx clear and oral mucous membranes moist  NECK: no adenopathy, no asymmetry, masses, or scars and thyroid normal to palpation  RESP: lungs clear to auscultation - no rales, rhonchi or wheezes  CV: regular rate and rhythm, normal S1 S2, no S3 or S4, no murmur, click or rub, no peripheral edema and peripheral pulses strong  ABDOMEN: soft, nontender, no hepatosplenomegaly, no masses and bowel sounds normal  MS: no musculoskeletal defects are noted and gait is age appropriate without ataxia  NEURO: Normal strength and tone, sensory exam grossly normal, mentation intact and speech normal  PSYCH: mentation appears normal and affect normal/bright    Diagnostic Test Results:  Pending    EKG was done, normal.       ASSESSMENT/PLAN:       ICD-10-CM    1. Routine general medical examination at a health care facility  Z00.00 Pap screen with HPV -  "recommended age 30 - 65 years     CBC with platelets and differential     Comprehensive metabolic panel (BMP + Alb, Alk Phos, ALT, AST, Total. Bili, TP)     Lipid Profile (Chol, Trig, HDL, LDL calc)   2. Early onset menopause  E28.319    3. Change in weight  R68.89 TSH with free T4 reflex   4. Atypical chest pain  R07.89 EKG 12-lead complete w/read - Clinics     TSH with free T4 reflex   5. Lipoma of skin and subcutaneous tissue  D17.30 Adult General Surg Referral       Patient has been advised of split billing requirements and indicates understanding: Yes  COUNSELING:  Reviewed preventive health counseling, as reflected in patient instructions       Regular exercise       Healthy diet/nutrition       Osteoporosis prevention/bone health    Estimated body mass index is 25.55 kg/m  as calculated from the following:    Height as of 2/19/20: 1.689 m (5' 6.5\").    Weight as of 2/19/20: 72.9 kg (160 lb 11.2 oz).    Weight management plan: Discussed healthy diet and exercise guidelines    She reports that she has quit smoking. Her smoking use included cigarettes. She has never used smokeless tobacco.      Counseling Resources:  ATP IV Guidelines  Pooled Cohorts Equation Calculator  Breast Cancer Risk Calculator  BRCA-Related Cancer Risk Assessment: FHS-7 Tool  FRAX Risk Assessment  ICSI Preventive Guidelines  Dietary Guidelines for Americans, 2010  USDA's MyPlate  ASA Prophylaxis  Lung CA Screening    [facility]  "

## 2022-01-03 NOTE — PATIENT INSTRUCTIONS
"1. Pap will go to the \"pap\" nurse - results typically come back in about 1 week    2. Update - cell counts, metabolic (liver/kidney/blood sugar/electrolyes), lipids     3. If you would like  - Gynecology consult for menopause, family history workup  - EKG pending  General surgery referral    Preventive Health Recommendations  Female Ages 40 to 49    Yearly exam:     See your health care provider every year in order to  1. Review health changes.   2. Discuss preventive care.    3. Review your medicines if your doctor prescribed any.      Get a Pap test every three years (unless you have an abnormal result and your provider advises testing more often).      If you get Pap tests with HPV test, you only need to test every 5 years, unless you have an abnormal result. You do not need a Pap test if your uterus was removed (hysterectomy) and you have not had cancer.      You should be tested each year for STDs (sexually transmitted diseases), if you're at risk.     Ask your doctor if you should have a mammogram.      Have a colonoscopy (test for colon cancer) if someone in your family has had colon cancer or polyps before age 50.       Have a cholesterol test every 5 years.       Have a diabetes test (fasting glucose) after age 45. If you are at risk for diabetes, you should have this test every 3 years.    Shots: Get a flu shot each year. Get a tetanus shot every 10 years.     Nutrition:     Eat at least 5 servings of fruits and vegetables each day.    Eat whole-grain bread, whole-wheat pasta and brown rice instead of white grains and rice.    Get adequate Calcium and Vitamin D.      Lifestyle    Exercise at least 150 minutes a week (an average of 30 minutes a day, 5 days a week). This will help you control your weight and prevent disease.    Limit alcohol to one drink per day.    No smoking.     Wear sunscreen to prevent skin cancer.    See your dentist every six months for an exam and cleaning.  "

## 2022-01-05 ENCOUNTER — TELEPHONE (OUTPATIENT)
Dept: SURGERY | Facility: CLINIC | Age: 47
End: 2022-01-05

## 2022-01-05 ENCOUNTER — OFFICE VISIT (OUTPATIENT)
Dept: SURGERY | Facility: CLINIC | Age: 47
End: 2022-01-05
Attending: PHYSICIAN ASSISTANT
Payer: COMMERCIAL

## 2022-01-05 VITALS
BODY MASS INDEX: 25.74 KG/M2 | HEIGHT: 67 IN | WEIGHT: 164 LBS | TEMPERATURE: 98.5 F | SYSTOLIC BLOOD PRESSURE: 124 MMHG | DIASTOLIC BLOOD PRESSURE: 78 MMHG

## 2022-01-05 DIAGNOSIS — D17.30 LIPOMA OF SKIN AND SUBCUTANEOUS TISSUE: ICD-10-CM

## 2022-01-05 LAB
BKR LAB AP GYN ADEQUACY: NORMAL
BKR LAB AP GYN INTERPRETATION: NORMAL
BKR LAB AP HPV REFLEX: NORMAL
BKR LAB AP LMP: NORMAL
BKR LAB AP PREVIOUS ABNORMAL: NORMAL
PATH REPORT.COMMENTS IMP SPEC: NORMAL
PATH REPORT.COMMENTS IMP SPEC: NORMAL
PATH REPORT.RELEVANT HX SPEC: NORMAL

## 2022-01-05 PROCEDURE — 99203 OFFICE O/P NEW LOW 30 MIN: CPT | Performed by: SURGERY

## 2022-01-05 ASSESSMENT — MIFFLIN-ST. JEOR: SCORE: 1399.22

## 2022-01-05 NOTE — TELEPHONE ENCOUNTER
Type of surgery:   excision multiple lipomas of the upper and lower extremities     Location of surgery: [facility]  Date and time of surgery: 1/28  Surgeon: Soila  Pre-Op Appt Date: NA  Post-Op Appt Date: 2/9   Packet sent out: Yes  Pre-cert/Authorization completed:  No  Date: na

## 2022-01-05 NOTE — PROGRESS NOTES
Patient seen in consultation for multiple lipomas    HPI:  Patient is a 46 year old female with history of multiple lipomas with a few excised in the past. She presents with several new areas of concern. She has multiple on her anterior thighs bilaterally that are enlarging and painful when she is on her feet. She also has one on her left tricep area that is bothersome at times as well. She denies any overt signs of infection. She is not on any blood thinning medications.    Review Of Systems    Skin: as above  Ears/Nose/Throat: negative  Respiratory: No shortness of breath, dyspnea on exertion, cough, or hemoptysis  Cardiovascular: negative  Gastrointestinal: negative  Genitourinary: negative  Musculoskeletal: negative  Neurologic: negative  Hematologic/Lymphatic/Immunologic: negative  Endocrine: negative      Past Medical History:   Diagnosis Date     Lipoma     multiple on legs       Past Surgical History:   Procedure Laterality Date     SURGICAL PATHOLOGY EXAM      multiple       Family History   Problem Relation Age of Onset     Coronary Artery Disease Mother      Aneurysm Mother      Other Cancer Maternal Grandmother         cervical     Mental Illness Paternal Grandfather         alzheimers     Cancer Nephew      Breast Cancer Paternal Aunt        Social History     Socioeconomic History     Marital status:      Spouse name: Not on file     Number of children: Not on file     Years of education: Not on file     Highest education level: Not on file   Occupational History     Comment: [employer]   Tobacco Use     Smoking status: Former Smoker     Types: Cigarettes     Smokeless tobacco: Never Used   Substance and Sexual Activity     Alcohol use: Yes     Alcohol/week: 0.0 standard drinks     Drug use: No     Sexual activity: Yes     Partners: Male     Birth control/protection: Male Surgical     Comment: vas   Other Topics Concern     Parent/sibling w/ CABG, MI or angioplasty before 65F 55M? Not Asked   Social  "History Narrative     Not on file     Social Determinants of Health     Financial Resource Strain: Not on file   Food Insecurity: Not on file   Transportation Needs: Not on file   Physical Activity: Not on file   Stress: Not on file   Social Connections: Not on file   Intimate Partner Violence: Not on file   Housing Stability: Not on file       No current outpatient medications on file.       Medications and history reviewed    Physical exam:  Vitals: /78   Temp 98.5  F (36.9  C) (Temporal)   Ht 1.69 m (5' 6.54\")   Wt 74.4 kg (164 lb)   LMP 06/01/2016   BMI 26.04 kg/m    BMI= Body mass index is 26.04 kg/m .    Constitutional: Healthy, alert, non-distressed   Head: Normo-cephalic, atraumatic, no lesions, masses or tenderness   Cardiovascular: RRR, no new murmurs, +S1, +S2 heart sounds, no clicks, rubs or gallops   Respiratory: CTAB, no rales, rhonchi or wheezing, equal chest rise, good respiratory effort   Gastrointestinal: Soft, non-tender, non distended, no rebound rigidity or guarding, no masses or hernias palpated   : Deferred  Musculoskeletal: Moves all extremities, normal  strength, no deformities noted   Skin: right thigh lipoma ~1.5 cm, left thigh lipomas 1-2cm, could not palpate one one upper left are at this time.   Psychiatric: Mentation appears normal, affect appropriate   Hematologic/Lymphatic/Immunologic: Normal cervical and supraclavicular lymph nodes   Patient able to get up on table without difficulty.    Labs show:  No results found for this or any previous visit (from the past 24 hour(s)).    Imaging shows:  none    Assessment:     ICD-10-CM    1. Lipoma of skin and subcutaneous tissue  D17.30 Adult General Surg Referral     Case Request: excision multiple lipomas of the upper and lower extremities     Case Request: excision multiple lipomas of the upper and lower extremities     Plan: I recommend OR excision of multiple symptomatic and enlarging lipomas with local anesthesia. She " has previously had sedation but does not want any this time. We discussed the procedure in detail. We also discussed the risks, benefits, alternatives and post-op care and restrictions. After our informed discussion we decided to proceed with the proposed surgery.      Ulises mC DO

## 2022-01-05 NOTE — LETTER
1/5/2022         RE: Nilam Orozco  [address] [facility] [address]        Dear Colleague,    Thank you for referring your patient, Nilam Orozco, to the [facility]. Please see a copy of my visit note below.    Patient seen in consultation for multiple lipomas    HPI:  Patient is a 46 year old female with history of multiple lipomas with a few excised in the past. She presents with several new areas of concern. She has multiple on her anterior thighs bilaterally that are enlarging and painful when she is on her feet. She also has one on her left tricep area that is bothersome at times as well. She denies any overt signs of infection. She is not on any blood thinning medications.    Review Of Systems    Skin: as above  Ears/Nose/Throat: negative  Respiratory: No shortness of breath, dyspnea on exertion, cough, or hemoptysis  Cardiovascular: negative  Gastrointestinal: negative  Genitourinary: negative  Musculoskeletal: negative  Neurologic: negative  Hematologic/Lymphatic/Immunologic: negative  Endocrine: negative      Past Medical History:   Diagnosis Date     Lipoma     multiple on legs       Past Surgical History:   Procedure Laterality Date     SURGICAL PATHOLOGY EXAM      multiple       Family History   Problem Relation Age of Onset     Coronary Artery Disease Mother      Aneurysm Mother      Other Cancer Maternal Grandmother         cervical     Mental Illness Paternal Grandfather         alzheimers     Cancer Nephew      Breast Cancer Paternal Aunt        Social History     Socioeconomic History     Marital status:      Spouse name: Not on file     Number of children: Not on file     Years of education: Not on file     Highest education level: Not on file   Occupational History     Comment: [employer]   Tobacco Use     Smoking status: Former Smoker     Types: Cigarettes     Smokeless tobacco: Never Used   Substance and Sexual Activity     Alcohol use: Yes     Alcohol/week: 0.0  "standard drinks     Drug use: No     Sexual activity: Yes     Partners: Male     Birth control/protection: Male Surgical     Comment: vas   Other Topics Concern     Parent/sibling w/ CABG, MI or angioplasty before 65F 55M? Not Asked   Social History Narrative     Not on file     Social Determinants of Health     Financial Resource Strain: Not on file   Food Insecurity: Not on file   Transportation Needs: Not on file   Physical Activity: Not on file   Stress: Not on file   Social Connections: Not on file   Intimate Partner Violence: Not on file   Housing Stability: Not on file       No current outpatient medications on file.       Medications and history reviewed    Physical exam:  Vitals: /78   Temp 98.5  F (36.9  C) (Temporal)   Ht 1.69 m (5' 6.54\")   Wt 74.4 kg (164 lb)   LMP 06/01/2016   BMI 26.04 kg/m    BMI= Body mass index is 26.04 kg/m .    Constitutional: Healthy, alert, non-distressed   Head: Normo-cephalic, atraumatic, no lesions, masses or tenderness   Cardiovascular: RRR, no new murmurs, +S1, +S2 heart sounds, no clicks, rubs or gallops   Respiratory: CTAB, no rales, rhonchi or wheezing, equal chest rise, good respiratory effort   Gastrointestinal: Soft, non-tender, non distended, no rebound rigidity or guarding, no masses or hernias palpated   : Deferred  Musculoskeletal: Moves all extremities, normal  strength, no deformities noted   Skin: right thigh lipoma ~1.5 cm, left thigh lipomas 1-2cm, could not palpate one one upper left are at this time.   Psychiatric: Mentation appears normal, affect appropriate   Hematologic/Lymphatic/Immunologic: Normal cervical and supraclavicular lymph nodes   Patient able to get up on table without difficulty.    Labs show:  No results found for this or any previous visit (from the past 24 hour(s)).    Imaging shows:  none    Assessment:     ICD-10-CM    1. Lipoma of skin and subcutaneous tissue  D17.30 Adult General Surg Referral     Case Request: " excision multiple lipomas of the upper and lower extremities     Case Request: excision multiple lipomas of the upper and lower extremities     Plan: I recommend OR excision of multiple symptomatic and enlarging lipomas with local anesthesia. She has previously had sedation but does not want any this time. We discussed the procedure in detail. We also discussed the risks, benefits, alternatives and post-op care and restrictions. After our informed discussion we decided to proceed with the proposed surgery.      Ulises Cm DO        Again, thank you for allowing me to participate in the care of your patient.        Sincerely,        Ulises Cm DO

## 2022-01-06 LAB
HUMAN PAPILLOMA VIRUS 16 DNA: NEGATIVE
HUMAN PAPILLOMA VIRUS 18 DNA: NEGATIVE
HUMAN PAPILLOMA VIRUS FINAL DIAGNOSIS: NORMAL
HUMAN PAPILLOMA VIRUS OTHER HR: NEGATIVE

## 2022-01-15 ENCOUNTER — HEALTH MAINTENANCE LETTER (OUTPATIENT)
Age: 47
End: 2022-01-15

## 2022-01-17 DIAGNOSIS — Z11.59 ENCOUNTER FOR SCREENING FOR OTHER VIRAL DISEASES: Primary | ICD-10-CM

## 2022-01-24 ENCOUNTER — LAB (OUTPATIENT)
Dept: LAB | Facility: CLINIC | Age: 47
End: 2022-01-24
Payer: COMMERCIAL

## 2022-01-24 DIAGNOSIS — Z11.59 ENCOUNTER FOR SCREENING FOR OTHER VIRAL DISEASES: ICD-10-CM

## 2022-01-24 PROCEDURE — U0003 INFECTIOUS AGENT DETECTION BY NUCLEIC ACID (DNA OR RNA); SEVERE ACUTE RESPIRATORY SYNDROME CORONAVIRUS 2 (SARS-COV-2) (CORONAVIRUS DISEASE [COVID-19]), AMPLIFIED PROBE TECHNIQUE, MAKING USE OF HIGH THROUGHPUT TECHNOLOGIES AS DESCRIBED BY CMS-2020-01-R: HCPCS | Performed by: SURGERY

## 2022-01-25 LAB — SARS-COV-2 RNA RESP QL NAA+PROBE: NOT DETECTED

## 2022-01-28 ENCOUNTER — HOSPITAL ENCOUNTER (OUTPATIENT)
Facility: CLINIC | Age: 47
Discharge: HOME | End: 2022-01-28
Attending: SURGERY | Admitting: SURGERY
Payer: COMMERCIAL

## 2022-01-28 VITALS
DIASTOLIC BLOOD PRESSURE: 84 MMHG | RESPIRATION RATE: 16 BRPM | TEMPERATURE: 98.4 F | OXYGEN SATURATION: 99 % | SYSTOLIC BLOOD PRESSURE: 124 MMHG

## 2022-01-28 PROCEDURE — 710N000012 HC RECOVERY PHASE 2, PER MINUTE: Performed by: SURGERY

## 2022-01-28 PROCEDURE — 27327 EXC THIGH/KNEE LES SC < 3 CM: CPT | Mod: RT | Performed by: SURGERY

## 2022-01-28 PROCEDURE — 250N000009 HC RX 250: Performed by: SURGERY

## 2022-01-28 PROCEDURE — 999N000141 HC STATISTIC PRE-PROCEDURE NURSING ASSESSMENT: Performed by: SURGERY

## 2022-01-28 PROCEDURE — 88304 TISSUE EXAM BY PATHOLOGIST: CPT | Mod: TC | Performed by: SURGERY

## 2022-01-28 PROCEDURE — 27337 EXC THIGH/KNEE LES SC 3 CM/>: CPT | Mod: LT | Performed by: SURGERY

## 2022-01-28 PROCEDURE — 23071 EXC SHOULDER LES SC 3 CM/>: CPT | Mod: LT | Performed by: SURGERY

## 2022-01-28 PROCEDURE — 360N000075 HC SURGERY LEVEL 2, PER MIN: Performed by: SURGERY

## 2022-01-28 PROCEDURE — 272N000001 HC OR GENERAL SUPPLY STERILE: Performed by: SURGERY

## 2022-01-28 RX ORDER — BUPIVACAINE HYDROCHLORIDE AND EPINEPHRINE 2.5; 5 MG/ML; UG/ML
INJECTION, SOLUTION INFILTRATION; PERINEURAL PRN
Status: DISCONTINUED | OUTPATIENT
Start: 2022-01-28 | End: 2022-01-28 | Stop reason: HOSPADM

## 2022-01-28 NOTE — LETTER
Nilam Orozco  [address] [facility] [address]    February 2, 2022      Dear Nilam,  This letter is to inform you of the results of your pathology report from your recent surgery.  Your pathology report was:  Final Diagnosis   A.  Soft tissue, left upper arm lipoma: Excision:  - Mature adipose tissue, consistent with lipoma     B.  Soft tissue, right leg lipoma: Excision:  - Angiolipoma     C.  Soft tissue, left leg lipomas: Excision:  - Angiolipoma     All consistent with lipomas without any concerning features.  If you have further questions please don t hesitate to call our clinic at [phone].   Sincerely,     Ulises Cm, DO

## 2022-01-28 NOTE — OP NOTE
Procedure Date: 01/28/2022    PROCEDURE:  Multiple lipomatous excisions of the left upper extremity, right lower extremity and left lower extremity.    PREOPERATIVE DIAGNOSIS:  Multiple lipomas.    POSTOPERATIVE DIAGNOSIS:  Multiple lipomas.    SURGEON:  Ulises Cm DO    ASSISTANT:  None.    ANESTHESIA:  Local anesthesia only.    COMPLICATIONS:  None immediately.    ESTIMATED BLOOD LOSS:  10 mL    SPECIMENS:  We sent 3 specimen containers:  1.  Left upper extremity, this measured 3 x 2 x 1 cm.  2.  Right lower extremity, 1 specimen measuring 2 x 1.5 x 0.5 cm.  3.  Left lower extremity.  There were 4 different specimens.  Here, 1 measuring 2 x 1.5 x 1 cm,  next 1 measuring 1 x 1 x 1 cm, next 1 measuring 1 x 1 x 1 cm, next 1 measuring 4.5 cm x 3 x 1 cm.    INDICATIONS FOR PROCEDURE:  Nilam is a 46-year-old female with history of lipomas in the past with new growing and painful lipomas.  We discussed her options, an office excision versus or OR excision with or without sedation.  She opted for excision without sedation with just local anesthesia.  We discussed the procedure in detail as well as the risks, benefits and alternatives, postoperative care and restrictions.  After informed discussion, we agreed to proceed with surgery.    DESCRIPTION OF PROCEDURE:  After informed consent was obtained, the patient was brought to the preoperative holding area to the operating room and placed in supine position.  We prepped and draped in normal sterile fashion.  Timeout was performed.  After correct patient and correct procedure were verified, we began by instilling 0.25% Marcaine with epinephrine for local anesthesia in the skin and subcutaneous tissues overlying the areas that were previously marked in the left upper extremity and bilateral lower extremities.      A linear incision in the proximal left upper extremity was made.  Blunt dissection was used to eviscerate a 3 x 2 x 1 cm lipoma.  Hemostasis was verified.   This incision was closed with inverted interrupted 3-0 Vicryl sutures, running 4-0 Monocryl suture was used for the skin and Exofin dressing was applied.      We then turned our attention to the legs.  Proximal right leg was first. We made a linear incision overlying the mass.  With blunt dissection, we eviscerated 2 x 1.25 x 0.5 cm lipoma.  We then moved our attention to the proximal left leg.  Linear incisions were made on all 4 of these masses.  Again, they measured 2 x 1.5 x 1 cm, 1 x 1 x 1 cm, 1 x 1 x 1 cm, and 4.5 x 3 x 1 cm.  All these incisions were closed with inverted interrupted 3-0 Vicryl sutures for the dermal layer and running subcuticular 4-0 Monocryl suture for the skin.  Exofin dressing was applied.      At the completion of the case, all instruments, needles and sponges were accounted for, after a correct count.  The patient was then brought to the recovery room in stable condition.    Ulises Cm DO        D: 2022   T: 2022   MT: LYNETTE    Name:     KOKO MOONEYKaitlin  MRN:      [id number]        Account:        [id number]   :      1975           Procedure Date: 2022     Document: [id number]

## 2022-01-28 NOTE — OR NURSING
Patient is declined to remove jewlery  ie.Rings from each ring finger.  Patient was informed of the potential for a burn if cautery is used during the local procedure.  She stated she is aware and wished to proceed

## 2022-01-28 NOTE — BRIEF OP NOTE
[facility] Brief Operative Note    Pre-operative diagnosis: Lipoma of skin and subcutaneous tissue [D17.30]   Post-operative diagnosis multiple lipomas    Procedure: Procedure(s):  excision multiple lipomas of the upper and lower extremities   Surgeon(s): Surgeon(s) and Role:     * Ulises Cm,  - Primary   Estimated blood loss: 5ml    Specimens: ID Type Source Tests Collected by Time Destination   1 : Left Upper Arm Lipoma Tissue Arm, Left SURGICAL PATHOLOGY EXAM Ulises Cm,  1/28/2022 12:06 PM    2 : Right Leg Lipoma Tissue Leg, Right SURGICAL PATHOLOGY EXAM Ulises Cm,  1/28/2022 12:29 PM    3 : Left Leg Lipomas Tissue Leg, left SURGICAL PATHOLOGY EXAM Ulises Cm, DO 1/28/2022 12:32 PM       Findings: Multiple lipomas of the LUE, and BLEs

## 2022-02-01 LAB
PATH REPORT.COMMENTS IMP SPEC: NORMAL
PATH REPORT.COMMENTS IMP SPEC: NORMAL
PATH REPORT.FINAL DX SPEC: NORMAL
PATH REPORT.GROSS SPEC: NORMAL
PATH REPORT.MICROSCOPIC SPEC OTHER STN: NORMAL
PATH REPORT.RELEVANT HX SPEC: NORMAL
PHOTO IMAGE: NORMAL

## 2022-02-01 PROCEDURE — 88304 TISSUE EXAM BY PATHOLOGIST: CPT | Mod: 26 | Performed by: PATHOLOGY

## 2022-06-16 ENCOUNTER — APPOINTMENT (OUTPATIENT)
Dept: CT IMAGING | Facility: CLINIC | Age: 47
End: 2022-06-16
Attending: PHYSICIAN ASSISTANT
Payer: COMMERCIAL

## 2022-06-16 ENCOUNTER — APPOINTMENT (OUTPATIENT)
Dept: GENERAL RADIOLOGY | Facility: CLINIC | Age: 47
End: 2022-06-16
Attending: PHYSICIAN ASSISTANT
Payer: COMMERCIAL

## 2022-06-16 ENCOUNTER — HOSPITAL ENCOUNTER (EMERGENCY)
Facility: CLINIC | Age: 47
Discharge: HOME | End: 2022-06-16
Attending: PHYSICIAN ASSISTANT | Admitting: PHYSICIAN ASSISTANT
Payer: COMMERCIAL

## 2022-06-16 VITALS
DIASTOLIC BLOOD PRESSURE: 77 MMHG | HEART RATE: 84 BPM | BODY MASS INDEX: 26.06 KG/M2 | TEMPERATURE: 98.5 F | HEIGHT: 67 IN | RESPIRATION RATE: 17 BRPM | SYSTOLIC BLOOD PRESSURE: 115 MMHG | WEIGHT: 166 LBS | OXYGEN SATURATION: 98 %

## 2022-06-16 DIAGNOSIS — S13.9XXA NECK SPRAIN: ICD-10-CM
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
## 2022-06-16 DIAGNOSIS — S40.022A CONTUSION OF LEFT ARM: ICD-10-CM

## 2022-06-16 DIAGNOSIS — M79.671 RIGHT FOOT PAIN: ICD-10-CM

## 2022-06-16 DIAGNOSIS — S23.9XXA THORACIC BACK SPRAIN: ICD-10-CM

## 2022-06-16 PROCEDURE — 73030 X-RAY EXAM OF SHOULDER: CPT | Mod: LT

## 2022-06-16 PROCEDURE — 72125 CT NECK SPINE W/O DYE: CPT

## 2022-06-16 PROCEDURE — 73630 X-RAY EXAM OF FOOT: CPT | Mod: RT

## 2022-06-16 PROCEDURE — 72128 CT CHEST SPINE W/O DYE: CPT

## 2022-06-16 PROCEDURE — 73060 X-RAY EXAM OF HUMERUS: CPT | Mod: LT

## 2022-06-16 PROCEDURE — 73070 X-RAY EXAM OF ELBOW: CPT | Mod: LT

## 2022-06-16 PROCEDURE — 99284 EMERGENCY DEPT VISIT MOD MDM: CPT | Performed by: PHYSICIAN ASSISTANT

## 2022-06-16 PROCEDURE — 99285 EMERGENCY DEPT VISIT HI MDM: CPT | Mod: 25 | Performed by: PHYSICIAN ASSISTANT
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
## 2022-06-17 NOTE — DISCHARGE INSTRUCTIONS
It was a pleasure working with you today!  I hope your condition improves rapidly!     Please REST!  Use ice over the painful areas for 20 minutes every couple hours for the first 24 hours while you are awake.  Then switch to using heat.  It is okay to use your home ibuprofen 800 mg every 8 hours with food for inflammation on a consistent basis for than her first 3 days and then as needed after that.  You can also use Tylenol up to 1000 mg every 6 hours as needed for pain above and beyond the ibuprofen as well.  It is okay to use your home cyclobenzaprine 10 mg every 8 hours as needed for muscle spasm as well.    Please follow-up with your primary care provider next week if your symptoms are worse or not improving as expected.
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
## 2022-12-26 ENCOUNTER — HEALTH MAINTENANCE LETTER (OUTPATIENT)
Age: 47
End: 2022-12-26

## 2023-04-22 ENCOUNTER — HEALTH MAINTENANCE LETTER (OUTPATIENT)
Age: 48
End: 2023-04-22

## 2023-04-28 ENCOUNTER — HOSPITAL ENCOUNTER (OUTPATIENT)
Dept: MAMMOGRAPHY | Facility: CLINIC | Age: 48
Discharge: HOME | End: 2023-04-28
Admitting: RADIOLOGY
Payer: COMMERCIAL

## 2023-04-28 DIAGNOSIS — Z12.31 VISIT FOR SCREENING MAMMOGRAM: ICD-10-CM

## 2023-04-28 PROCEDURE — 77067 SCR MAMMO BI INCL CAD: CPT

## 2023-09-19 ENCOUNTER — OFFICE VISIT (OUTPATIENT)
Dept: INTERNAL MEDICINE | Facility: CLINIC | Age: 48
End: 2023-09-19
Payer: COMMERCIAL

## 2023-09-19 VITALS
SYSTOLIC BLOOD PRESSURE: 104 MMHG | OXYGEN SATURATION: 98 % | DIASTOLIC BLOOD PRESSURE: 68 MMHG | WEIGHT: 167 LBS | HEIGHT: 67 IN | TEMPERATURE: 98 F | RESPIRATION RATE: 16 BRPM | BODY MASS INDEX: 26.21 KG/M2 | HEART RATE: 92 BPM

## 2023-09-19 DIAGNOSIS — M77.11 LATERAL EPICONDYLITIS OF RIGHT ELBOW: ICD-10-CM

## 2023-09-19 DIAGNOSIS — E78.5 HYPERLIPIDEMIA LDL GOAL <130: ICD-10-CM

## 2023-09-19 DIAGNOSIS — Z12.11 SCREEN FOR COLON CANCER: ICD-10-CM

## 2023-09-19 DIAGNOSIS — M79.661 PAIN OF RIGHT LOWER LEG: ICD-10-CM

## 2023-09-19 DIAGNOSIS — Z83.49 FAMILY HISTORY OF HEMOCHROMATOSIS: ICD-10-CM

## 2023-09-19 DIAGNOSIS — Z00.00 ROUTINE GENERAL MEDICAL EXAMINATION AT A HEALTH CARE FACILITY: Primary | ICD-10-CM

## 2023-09-19 LAB
ALBUMIN SERPL BCG-MCNC: 4.8 G/DL (ref 3.5–5.2)
ALBUMIN UR-MCNC: NEGATIVE MG/DL
ALP SERPL-CCNC: 65 U/L (ref 35–104)
ALT SERPL W P-5'-P-CCNC: 25 U/L (ref 0–50)
AMORPH CRY #/AREA URNS HPF: ABNORMAL /HPF
ANION GAP SERPL CALCULATED.3IONS-SCNC: 12 MMOL/L (ref 7–15)
APPEARANCE UR: ABNORMAL
AST SERPL W P-5'-P-CCNC: 20 U/L (ref 0–45)
BILIRUB SERPL-MCNC: 0.4 MG/DL
BILIRUB UR QL STRIP: NEGATIVE
BUN SERPL-MCNC: 14.5 MG/DL (ref 6–20)
CALCIUM SERPL-MCNC: 9.9 MG/DL (ref 8.6–10)
CHLORIDE SERPL-SCNC: 102 MMOL/L (ref 98–107)
CHOLEST SERPL-MCNC: 200 MG/DL
COLOR UR AUTO: YELLOW
CREAT SERPL-MCNC: 0.83 MG/DL (ref 0.51–0.95)
DEPRECATED HCO3 PLAS-SCNC: 26 MMOL/L (ref 22–29)
EGFRCR SERPLBLD CKD-EPI 2021: 85 ML/MIN/1.73M2
ERYTHROCYTE [DISTWIDTH] IN BLOOD BY AUTOMATED COUNT: 12.8 % (ref 10–15)
FERRITIN SERPL-MCNC: 115 NG/ML (ref 6–175)
GLUCOSE SERPL-MCNC: 113 MG/DL (ref 70–99)
GLUCOSE UR STRIP-MCNC: NEGATIVE MG/DL
HCT VFR BLD AUTO: 39.6 % (ref 35–47)
HDLC SERPL-MCNC: 76 MG/DL
HGB BLD-MCNC: 13.6 G/DL (ref 11.7–15.7)
HGB UR QL STRIP: ABNORMAL
IRON BINDING CAPACITY (ROCHE): 269 UG/DL (ref 240–430)
IRON SATN MFR SERPL: 56 % (ref 15–46)
IRON SERPL-MCNC: 151 UG/DL (ref 37–145)
KETONES UR STRIP-MCNC: NEGATIVE MG/DL
LDLC SERPL CALC-MCNC: 101 MG/DL
LEUKOCYTE ESTERASE UR QL STRIP: NEGATIVE
MCH RBC QN AUTO: 29.9 PG (ref 26.5–33)
MCHC RBC AUTO-ENTMCNC: 34.3 G/DL (ref 31.5–36.5)
MCV RBC AUTO: 87 FL (ref 78–100)
MUCOUS THREADS #/AREA URNS LPF: PRESENT /LPF
NITRATE UR QL: NEGATIVE
NONHDLC SERPL-MCNC: 124 MG/DL
PH UR STRIP: 7 [PH] (ref 5–7)
PLATELET # BLD AUTO: 276 10E3/UL (ref 150–450)
POTASSIUM SERPL-SCNC: 4.2 MMOL/L (ref 3.4–5.3)
PROT SERPL-MCNC: 7.3 G/DL (ref 6.4–8.3)
RBC # BLD AUTO: 4.55 10E6/UL (ref 3.8–5.2)
RBC URINE: 1 /HPF
SODIUM SERPL-SCNC: 140 MMOL/L (ref 136–145)
SP GR UR STRIP: 1.01 (ref 1–1.03)
SQUAMOUS EPITHELIAL: 2 /HPF
TRIGL SERPL-MCNC: 117 MG/DL
UROBILINOGEN UR STRIP-MCNC: 2 MG/DL
WBC # BLD AUTO: 4.1 10E3/UL (ref 4–11)
WBC URINE: 0 /HPF

## 2023-09-19 PROCEDURE — 80053 COMPREHEN METABOLIC PANEL: CPT | Performed by: INTERNAL MEDICINE

## 2023-09-19 PROCEDURE — 99396 PREV VISIT EST AGE 40-64: CPT | Performed by: INTERNAL MEDICINE

## 2023-09-19 PROCEDURE — 83550 IRON BINDING TEST: CPT | Performed by: INTERNAL MEDICINE

## 2023-09-19 PROCEDURE — 36415 COLL VENOUS BLD VENIPUNCTURE: CPT | Performed by: INTERNAL MEDICINE

## 2023-09-19 PROCEDURE — 83540 ASSAY OF IRON: CPT | Performed by: INTERNAL MEDICINE

## 2023-09-19 PROCEDURE — 81001 URINALYSIS AUTO W/SCOPE: CPT | Performed by: INTERNAL MEDICINE

## 2023-09-19 PROCEDURE — 80061 LIPID PANEL: CPT | Performed by: INTERNAL MEDICINE

## 2023-09-19 PROCEDURE — 85027 COMPLETE CBC AUTOMATED: CPT | Performed by: INTERNAL MEDICINE

## 2023-09-19 PROCEDURE — 82728 ASSAY OF FERRITIN: CPT | Performed by: INTERNAL MEDICINE

## 2023-09-19 ASSESSMENT — ENCOUNTER SYMPTOMS
HEMATOCHEZIA: 0
WEAKNESS: 1
COUGH: 0
JOINT SWELLING: 0
NAUSEA: 0
SORE THROAT: 0
NERVOUS/ANXIOUS: 0
ARTHRALGIAS: 1
FREQUENCY: 0
HEMATURIA: 0
DIZZINESS: 0
HEADACHES: 0
PARESTHESIAS: 0
DYSURIA: 0
SHORTNESS OF BREATH: 0
BREAST MASS: 0
ABDOMINAL PAIN: 0
CONSTIPATION: 1
EYE PAIN: 0
MYALGIAS: 0
DIARRHEA: 0
HEARTBURN: 0
CHILLS: 0
FEVER: 0
PALPITATIONS: 0
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
## 2023-09-27 ENCOUNTER — MYC MEDICAL ADVICE (OUTPATIENT)
Dept: INTERNAL MEDICINE | Facility: CLINIC | Age: 48
End: 2023-09-27
Payer: COMMERCIAL

## 2023-09-27 ENCOUNTER — TELEPHONE (OUTPATIENT)
Dept: GASTROENTEROLOGY | Facility: CLINIC | Age: 48
End: 2023-09-27
Payer: COMMERCIAL

## 2023-09-27 DIAGNOSIS — Z83.49 FAMILY HISTORY OF HEMOCHROMATOSIS: Primary | ICD-10-CM

## 2023-09-27 NOTE — TELEPHONE ENCOUNTER
"Endoscopy Scheduling Screen    Have you had a positive Covid test in the last 14 days?  No    Are you active on MyChart?   Yes    What insurance is in the chart?  Other:      Ordering/Referring Provider:     ANGELICA OSORIO      (If ordering provider performs procedure, schedule with ordering provider unless otherwise instructed. )    BMI: Estimated body mass index is 26.55 kg/m  as calculated from the following:    Height as of 9/19/23: 1.689 m (5' 6.5\").    Weight as of 9/19/23: 75.8 kg (167 lb).     Sedation Ordered  moderate sedation.   If patient BMI > 50 do not schedule in [facility].    If patient BMI > 45 do not schedule at [facility].    Are you taking methadone or Suboxone?  No    Are you taking any prescription medications for pain 3 or more times per week?   No    Do you have a history of malignant hyperthermia or adverse reaction to anesthesia?  No    (Females) Are you currently pregnant?   No     Have you been diagnosed or told you have pulmonary hypertension?   No    Do you have an LVAD?  No    Have you been told you have moderate to severe sleep apnea?  No    Have you been told you have COPD, asthma, or any other lung disease?  No    Do you have any heart conditions?  No     Have you ever had an organ transplant?   No    Have you ever had or are you awaiting a heart or lung transplant?   No    Have you had a stroke or transient ischemic attack (TIA aka \"mini stroke\" in the last 6 months?   No    Have you been diagnosed with or been told you have cirrhosis of the liver?   No    Are you currently on dialysis?   No    Do you need assistance transferring?   No    BMI: Estimated body mass index is 26.55 kg/m  as calculated from the following:    Height as of 9/19/23: 1.689 m (5' 6.5\").    Weight as of 9/19/23: 75.8 kg (167 lb).     Is patients BMI > 40 and scheduling location [facility]?  No    Do you take an injectable medication for weight loss or diabetes (excluding insulin)?  No    Do you take the medication " Naltrexone?  No    Do you take blood thinners?  No       Prep   Are you currently on dialysis or do you have chronic kidney disease?  No    Do you have a diagnosis of diabetes?  No    Do you have a diagnosis of cystic fibrosis (CF)?  No    On a regular basis do you go 3 -5 days between bowel movements?  Yes (Extended Prep)    BMI > 40?  No    Preferred Pharmacy:    [facility] - [place], [place] - [address]  [address]  [facility] [address]  Phone: [phone] Fax: [phone]      Final Scheduling Details   Colonoscopy prep sent?  Golytely Extended Prep    Procedure scheduled  Colonoscopy    Surgeon:  AAMIR     Date of procedure:  11/07/2023     Pre-OP / PAC:   No - Not required for this site.    Location  [facility] - Per order.    Sedation   MAC/Deep Sedation  PER LOCATION      Patient Reminders:   You will receive a call from a Nurse to review instructions and health history.  This assessment must be completed prior to your procedure.  Failure to complete the Nurse assessment may result in the procedure being cancelled.      On the day of your procedure, please designate an adult(s) who can drive you home stay with you for the next 24 hours. The medicines used in the exam will make you sleepy. You will not be able to drive.      You cannot take public transportation, ride share services, or non-medical taxi service without a responsible caregiver.  Medical transport services are allowed with the requirement that a responsible caregiver will receive you at your destination.  We require that drivers and caregivers are confirmed prior to your procedure.

## 2023-10-17 NOTE — PROGRESS NOTES
Nilam Orozco  :  1975  DOS: 10/18/2023  MRN: [id number]  PCP: Brenton Wooten    Sports Medicine Clinic Visit      HPI  Nilam Orozco is a 48 year old female who is seen in consultation at the request of  Brenton Wooten D.O. presenting with lateral right elbow pain.    - Mechanism of Injury:  No inciting injury.   - Prior evaluation:    - 2023 with Dr. Wooten: Lateral right elbow pain noted. Orthopedic referral placed.  No imaging.    - Pain Character:  Pain has been present for  6 months and worsening .  Pain is in the lateral right elbow, with radiation into the distal upper arm and proximal forearm .   - Endorses:  aching pain, stiffness, pain that radiates down the  dorsal forearm to the hand  - Denies:  swelling, clicking, popping, grinding, mechanical locking symptoms, instability, numbness, tingling, radicular shooting pain, weakness.   - Alleviating factors:   rest, activity modifications  - Aggravating factors:  lifting, physical labor with repetitive arm movements.  - Treatments tried:   ibuprofen, ice, tennis elbow strap    - Patient Goals:  discuss treatment options  - Social History:   for [employer]. Has not been doing lifting due to back injury from last year.      Review of Systems  Musculoskeletal: as above  Remainder of review of systems is negative including constitutional, CV, pulmonary, GI, Skin and Neurologic except as noted in HPI or medical history.    Past Medical History:   Diagnosis Date    Lipoma     multiple on legs     Past Surgical History:   Procedure Laterality Date    EXCISE MASS LOWER EXTREMITY Bilateral 2022    Procedure: excision multiple lipomas of the lower extremities;  Surgeon: Ulises Cm DO;  [facility]    EXCISE MASS UPPER EXTREMITY Right 2022    Procedure: excision multiple lipomas of the upper extremities;  Surgeon: Ulises Cm DO;  [facility]    SURGICAL PATHOLOGY EXAM       "multiple     Family History   Problem Relation Age of Onset    Coronary Artery Disease Mother     Aneurysm Mother     Other Cancer Maternal Grandmother         cervical    Mental Illness Paternal Grandfather         alzheimers    Cancer Nephew     Breast Cancer Paternal Aunt          Objective  Ht 1.689 m (5' 6.5\")   Wt 75.8 kg (167 lb)   LMP 06/01/2016   BMI 26.55 kg/m      General: healthy, alert and in no acute distress.    HEENT: no scleral icterus or conjunctival erythema.   Skin: no suspicious lesions or rash. No jaundice.   CV: regular rhythm by palpation, 2+ distal pulses.  Resp: normal respiratory effort without conversational dyspnea.   Psych: normal mood and affect.    Gait: nonantalgic, appropriate coordination and balance.     Neuro:        - Sensation to light touch:    - Intact throughout the BUE including all peripheral nerve distributions.        - MSR:       RUE  LUE  - Biceps  2+ 2+  - Brachioradialis 2+ 2+  - Triceps  2+ 2+       - Special tests:   - Spurling's:  Neg   - Tinel's at the wrist:  Neg    - Tinel's at the elbow:  Neg     MSK - Elbow:       - Inspection:    - No significant swelling, erythema, warmth, ecchymosis, lesion, or atrophy noted.        - ROM:    - Full AROM/PROM with pain during wrist extension, supination.        - Palpation:    - TTP at the  lateral epicondyle, common extensor tendon.   - NTTP elsewhere.        - Strength:    RUE LUE  - Shoulder Abduction   5 5   - Shoulder Flexion   5 5   - Shoulder Internal Rotation  5 5   - Shoulder External Rotation  5 5  - Elbow Flexion   5 5  - Elbow Extension   5 5  - Forearm Pronation   5 5  - Forearm Supination   5* 5  - Wrist Extension   5* 5  - Wrist Flexion    5 5  - FDI     5 5  - ADM     5 5  - FPL     5 5  - APB     5 5  - EIP     5 5  - EDC     5 5  - APL/EPB    5 5           - Special tests:  - Cozen's:  ++Pos  - Valgus stress:  Neg    - Varus stress:  Neg       Radiology  I independently reviewed the available relevant " imaging in the chart, with my interpretation as above in HPI.       Assessment  1. Lateral epicondylitis of right elbow        Plan  Nilam Orozco is a pleasant 48 year old female that presents with   Lateral right elbow pain. History and physical exam appear most consistent with  lateral epicondylosis (tennis elbow).     Discussed the nature of the condition and treatment options and mutually agreed upon the following plan:    - Medications:          - Discussed pharmacologic options for pain relief.   - May use NSAIDs (Ibuprofen, Naproxen) or Acetaminophen (Tylenol) as needed for pain control.   - May also use topical medications such as lidocaine, IcyHot, BioFreeze, or Voltaren gel as needed for pain control.   - Injections:          - Discussed possible injection options and alternatives.    - Options include  interventional injections such as corticosteroid, PRP, or a Tenex procedure if symptoms persist.    - Deferred injections today and will consider them in the future as needed.   - Therapy:          - Discussed the benefits of occupational therapy vs home exercise program for optimization of range of motion, flexibility, strength, stability and function.   - Preference is for a home exercise program.   - Home Exercise Program given today in clinic and recommendation given to perform HEP daily and after exacerbations.  - Modalities:          - May use ice, heat, massage or other modalities as needed.   - Bracing:          - Discussed bracing options and recommend using  tennis elbow counterforce strap over the common extensor tendons just distal to the elbow.  Demonstrated proper strapping technique today in clinic.  May wear the strap during activities that typically cause pain (wrist extension and supination).     -  May also consider wearing a neutral wrist brace during work that prevents wrist extension and also allows the common extensor tendons to heal.  - Activity:          - Encouraged to remain  active and participate in regular activities as symptoms allow. Avoid or modify exacerbating activities as needed.  - Follow up:          - As needed in the future for re-evaluation and update to treatment plan, or sooner for new/worsening symptoms.  - Patient has clinic contact information for questions or concerns.       Moises King DO, CAANA ROSA  [facility]  [facility] - Department of Orthopedic Surgery       Disclaimer:  This note was prepared and written using Dragon Medical dictation software. As a result, there may be errors in the script that have gone undetected. Please consider this when interpreting the information in this note.

## 2023-10-18 ENCOUNTER — OFFICE VISIT (OUTPATIENT)
Dept: ORTHOPEDICS | Facility: CLINIC | Age: 48
End: 2023-10-18
Attending: INTERNAL MEDICINE
Payer: COMMERCIAL

## 2023-10-18 VITALS — BODY MASS INDEX: 26.21 KG/M2 | HEIGHT: 67 IN | WEIGHT: 167 LBS

## 2023-10-18 DIAGNOSIS — M77.11 LATERAL EPICONDYLITIS OF RIGHT ELBOW: Primary | ICD-10-CM

## 2023-10-18 PROCEDURE — 99204 OFFICE O/P NEW MOD 45 MIN: CPT | Performed by: STUDENT IN AN ORGANIZED HEALTH CARE EDUCATION/TRAINING PROGRAM

## 2023-10-18 ASSESSMENT — PAIN SCALES - GENERAL: PAINLEVEL: EXTREME PAIN (8)

## 2023-10-18 NOTE — LETTER
10/18/2023         RE: Nilam Orozco  [address] [facility] [address]        Dear Colleague,    Thank you for referring your patient, Nilam Orozco, to the [facility] [place]. Please see a copy of my visit note below.    Nilam Orozco  :  1975  DOS: 10/18/2023  MRN: [id number]  PCP: Brenton Wooten    Sports Medicine Clinic Visit      HPI  Nilam Orozco is a 48 year old female who is seen in consultation at the request of  Brenton Wooten D.O. presenting with lateral right elbow pain.    - Mechanism of Injury:  No inciting injury.   - Prior evaluation:    - 2023 with Dr. Wooten: Lateral right elbow pain noted. Orthopedic referral placed.  No imaging.    - Pain Character:  Pain has been present for  6 months and worsening .  Pain is in the lateral right elbow, with radiation into the distal upper arm and proximal forearm .   - Endorses:  aching pain, stiffness, pain that radiates down the  dorsal forearm to the hand  - Denies:  swelling, clicking, popping, grinding, mechanical locking symptoms, instability, numbness, tingling, radicular shooting pain, weakness.   - Alleviating factors:   rest, activity modifications  - Aggravating factors:  lifting, physical labor with repetitive arm movements.  - Treatments tried:   ibuprofen, ice, tennis elbow strap    - Patient Goals:  discuss treatment options  - Social History:   for [employer]. Has not been doing lifting due to back injury from last year.      Review of Systems  Musculoskeletal: as above  Remainder of review of systems is negative including constitutional, CV, pulmonary, GI, Skin and Neurologic except as noted in HPI or medical history.    Past Medical History:   Diagnosis Date     Lipoma     multiple on legs     Past Surgical History:   Procedure Laterality Date     EXCISE MASS LOWER EXTREMITY Bilateral 2022    Procedure: excision multiple lipomas of the lower extremities;  " Surgeon: Ulises Cm DO;  [facility]     EXCISE MASS UPPER EXTREMITY Right 1/28/2022    Procedure: excision multiple lipomas of the upper extremities;  Surgeon: Ulises Cm DO;  [facility]     SURGICAL PATHOLOGY EXAM      multiple     Family History   Problem Relation Age of Onset     Coronary Artery Disease Mother      Aneurysm Mother      Other Cancer Maternal Grandmother         cervical     Mental Illness Paternal Grandfather         alzheimers     Cancer Nephew      Breast Cancer Paternal Aunt          Objective  Ht 1.689 m (5' 6.5\")   Wt 75.8 kg (167 lb)   LMP 06/01/2016   BMI 26.55 kg/m      General: healthy, alert and in no acute distress.    HEENT: no scleral icterus or conjunctival erythema.   Skin: no suspicious lesions or rash. No jaundice.   CV: regular rhythm by palpation, 2+ distal pulses.  Resp: normal respiratory effort without conversational dyspnea.   Psych: normal mood and affect.    Gait: nonantalgic, appropriate coordination and balance.     Neuro:        - Sensation to light touch:    - Intact throughout the BUE including all peripheral nerve distributions.        - MSR:       RUE  LUE  - Biceps  2+ 2+  - Brachioradialis 2+ 2+  - Triceps  2+ 2+       - Special tests:   - Spurling's:  Neg   - Tinel's at the wrist:  Neg    - Tinel's at the elbow:  Neg     MSK - Elbow:       - Inspection:    - No significant swelling, erythema, warmth, ecchymosis, lesion, or atrophy noted.        - ROM:    - Full AROM/PROM with pain during wrist extension, supination.        - Palpation:    - TTP at the  lateral epicondyle, common extensor tendon.   - NTTP elsewhere.        - Strength:    RUE LUE  - Shoulder Abduction   5 5   - Shoulder Flexion   5 5   - Shoulder Internal Rotation  5 5   - Shoulder External Rotation  5 5  - Elbow Flexion   5 5  - Elbow Extension   5 5  - Forearm Pronation   5 5  - Forearm Supination   5* 5  - Wrist Extension   5* 5  - Wrist " Flexion    5 5  - FDI     5 5  - ADM     5 5  - FPL     5 5  - APB     5 5  - EIP     5 5  - EDC     5 5  - APL/EPB    5 5           - Special tests:  - Cozen's:  ++Pos  - Valgus stress:  Neg    - Varus stress:  Neg       Radiology  I independently reviewed the available relevant imaging in the chart, with my interpretation as above in HPI.       Assessment  1. Lateral epicondylitis of right elbow        Plan  Nilam Orozco is a pleasant 48 year old female that presents with   Lateral right elbow pain. History and physical exam appear most consistent with  lateral epicondylosis (tennis elbow).     Discussed the nature of the condition and treatment options and mutually agreed upon the following plan:    - Medications:          - Discussed pharmacologic options for pain relief.   - May use NSAIDs (Ibuprofen, Naproxen) or Acetaminophen (Tylenol) as needed for pain control.   - May also use topical medications such as lidocaine, IcyHot, BioFreeze, or Voltaren gel as needed for pain control.   - Injections:          - Discussed possible injection options and alternatives.    - Options include  interventional injections such as corticosteroid, PRP, or a Tenex procedure if symptoms persist.    - Deferred injections today and will consider them in the future as needed.   - Therapy:          - Discussed the benefits of occupational therapy vs home exercise program for optimization of range of motion, flexibility, strength, stability and function.   - Preference is for a home exercise program.   - Home Exercise Program given today in clinic and recommendation given to perform HEP daily and after exacerbations.  - Modalities:          - May use ice, heat, massage or other modalities as needed.   - Bracing:          - Discussed bracing options and recommend using  tennis elbow counterforce strap over the common extensor tendons just distal to the elbow.  Demonstrated proper strapping technique today in clinic.  May wear the  strap during activities that typically cause pain (wrist extension and supination).     -  May also consider wearing a neutral wrist brace during work that prevents wrist extension and also allows the common extensor tendons to heal.  - Activity:          - Encouraged to remain active and participate in regular activities as symptoms allow. Avoid or modify exacerbating activities as needed.  - Follow up:          - As needed in the future for re-evaluation and update to treatment plan, or sooner for new/worsening symptoms.  - Patient has clinic contact information for questions or concerns.       Moises King DO, CAQSM  [facility] - Department of Orthopedic Surgery       Disclaimer:  This note was prepared and written using Dragon Medical dictation software. As a result, there may be errors in the script that have gone undetected. Please consider this when interpreting the information in this note.       Again, thank you for allowing me to participate in the care of your patient.        Sincerely,        Moises King DO

## 2023-10-18 NOTE — PATIENT INSTRUCTIONS
Anjel Nilam Orozco ,     A copy of your assessment and our treatment plan that we discussed together is included below, as written in your medical chart.   If you have any questions, please feel free to call the clinic.     --------------------------------------------------  Nilam Orozco is a 48 year old female that presents with   Lateral right elbow pain. History and physical exam appear most consistent with  lateral epicondylosis (tennis elbow).     Discussed the nature of the condition and treatment options and mutually agreed upon the following plan:    - Medications:          - Discussed pharmacologic options for pain relief.   - May use NSAIDs (Ibuprofen, Naproxen) or Acetaminophen (Tylenol) as needed for pain control.   - May also use topical medications such as lidocaine, IcyHot, BioFreeze, or Voltaren gel as needed for pain control.   - Injections:          - Discussed possible injection options and alternatives.    - Options include  interventional injections such as corticosteroid, PRP, or a Tenex procedure if symptoms persist.    - Deferred injections today and will consider them in the future as needed.   - Therapy:          - Discussed the benefits of occupational therapy vs home exercise program for optimization of range of motion, flexibility, strength, stability and function.   - Preference is for a home exercise program.   - Home Exercise Program given today in clinic and recommendation given to perform HEP daily and after exacerbations.  - Modalities:          - May use ice, heat, massage or other modalities as needed.   - Bracing:          - Discussed bracing options and recommend using  tennis elbow counterforce strap over the common extensor tendons just distal to the elbow.  Demonstrated proper strapping technique today in clinic.  May wear the strap during activities that typically cause pain (wrist extension and supination).     -  May also consider wearing a neutral wrist brace during  work that prevents wrist extension and also allows the common extensor tendons to heal.  - Activity:          - Encouraged to remain active and participate in regular activities as symptoms allow. Avoid or modify exacerbating activities as needed.  - Follow up:          - As needed in the future for re-evaluation and update to treatment plan, or sooner for new/worsening symptoms.  - Patient has clinic contact information for questions or concerns.   --------------------------------------------------    It was a pleasure seeing you today. Thank you for choosing [facility] for your care.       Moises King DO, CAQSM  [facility] - Sports Medicine  [facility] - Department of Orthopedic Surgery     Disclaimer:  This note was prepared and written using Dragon Medical dictation software. As a result, there may be errors in the script that have gone undetected. Please consider this when interpreting the information in this note.

## 2023-11-06 ENCOUNTER — ANESTHESIA EVENT (OUTPATIENT)
Dept: GASTROENTEROLOGY | Facility: CLINIC | Age: 48
End: 2023-11-06
Payer: COMMERCIAL

## 2023-11-06 ASSESSMENT — LIFESTYLE VARIABLES: TOBACCO_USE: 1

## 2023-11-07 ENCOUNTER — ANESTHESIA (OUTPATIENT)
Dept: GASTROENTEROLOGY | Facility: CLINIC | Age: 48
End: 2023-11-07
Payer: COMMERCIAL

## 2023-11-07 ENCOUNTER — HOSPITAL ENCOUNTER (OUTPATIENT)
Facility: CLINIC | Age: 48
Discharge: HOME | End: 2023-11-07
Attending: SURGERY | Admitting: SURGERY
Payer: COMMERCIAL

## 2023-11-07 VITALS
SYSTOLIC BLOOD PRESSURE: 127 MMHG | OXYGEN SATURATION: 100 % | HEART RATE: 57 BPM | DIASTOLIC BLOOD PRESSURE: 83 MMHG | RESPIRATION RATE: 16 BRPM

## 2023-11-07 LAB — COLONOSCOPY: NORMAL

## 2023-11-07 PROCEDURE — 250N000011 HC RX IP 250 OP 636: Performed by: NURSE ANESTHETIST, CERTIFIED REGISTERED

## 2023-11-07 PROCEDURE — 258N000003 HC RX IP 258 OP 636: Performed by: SURGERY

## 2023-11-07 PROCEDURE — 250N000009 HC RX 250: Performed by: NURSE ANESTHETIST, CERTIFIED REGISTERED

## 2023-11-07 PROCEDURE — G0121 COLON CA SCRN NOT HI RSK IND: HCPCS | Performed by: SURGERY

## 2023-11-07 PROCEDURE — 45378 DIAGNOSTIC COLONOSCOPY: CPT | Performed by: SURGERY

## 2023-11-07 PROCEDURE — 258N000003 HC RX IP 258 OP 636: Performed by: NURSE ANESTHETIST, CERTIFIED REGISTERED

## 2023-11-07 PROCEDURE — 370N000017 HC ANESTHESIA TECHNICAL FEE, PER MIN: Performed by: SURGERY

## 2023-11-07 RX ORDER — SODIUM CHLORIDE, SODIUM LACTATE, POTASSIUM CHLORIDE, CALCIUM CHLORIDE 600; 310; 30; 20 MG/100ML; MG/100ML; MG/100ML; MG/100ML
INJECTION, SOLUTION INTRAVENOUS CONTINUOUS PRN
Status: DISCONTINUED | OUTPATIENT
Start: 2023-11-07 | End: 2023-11-07

## 2023-11-07 RX ORDER — PROPOFOL 10 MG/ML
INJECTION, EMULSION INTRAVENOUS PRN
Status: DISCONTINUED | OUTPATIENT
Start: 2023-11-07 | End: 2023-11-07

## 2023-11-07 RX ORDER — LIDOCAINE 40 MG/G
CREAM TOPICAL
Status: DISCONTINUED | OUTPATIENT
Start: 2023-11-07 | End: 2023-11-07 | Stop reason: HOSPADM

## 2023-11-07 RX ORDER — PROPOFOL 10 MG/ML
INJECTION, EMULSION INTRAVENOUS CONTINUOUS PRN
Status: DISCONTINUED | OUTPATIENT
Start: 2023-11-07 | End: 2023-11-07

## 2023-11-07 RX ORDER — SODIUM CHLORIDE, SODIUM LACTATE, POTASSIUM CHLORIDE, CALCIUM CHLORIDE 600; 310; 30; 20 MG/100ML; MG/100ML; MG/100ML; MG/100ML
INJECTION, SOLUTION INTRAVENOUS CONTINUOUS
Status: DISCONTINUED | OUTPATIENT
Start: 2023-11-07 | End: 2023-11-07 | Stop reason: HOSPADM

## 2023-11-07 RX ORDER — LIDOCAINE HYDROCHLORIDE 20 MG/ML
INJECTION, SOLUTION INFILTRATION; PERINEURAL PRN
Status: DISCONTINUED | OUTPATIENT
Start: 2023-11-07 | End: 2023-11-07

## 2023-11-07 RX ADMIN — SODIUM CHLORIDE, POTASSIUM CHLORIDE, SODIUM LACTATE AND CALCIUM CHLORIDE: 600; 310; 30; 20 INJECTION, SOLUTION INTRAVENOUS at 08:16

## 2023-11-07 RX ADMIN — PROPOFOL 175 MCG/KG/MIN: 10 INJECTION, EMULSION INTRAVENOUS at 09:07

## 2023-11-07 RX ADMIN — PROPOFOL 70 MG: 10 INJECTION, EMULSION INTRAVENOUS at 09:06

## 2023-11-07 RX ADMIN — SODIUM CHLORIDE, POTASSIUM CHLORIDE, SODIUM LACTATE AND CALCIUM CHLORIDE: 600; 310; 30; 20 INJECTION, SOLUTION INTRAVENOUS at 08:49

## 2023-11-07 RX ADMIN — LIDOCAINE HYDROCHLORIDE 25 MG: 20 INJECTION, SOLUTION INFILTRATION; PERINEURAL at 09:06

## 2023-11-07 ASSESSMENT — ACTIVITIES OF DAILY LIVING (ADL): ADLS_ACUITY_SCORE: 35

## 2023-11-07 NOTE — DISCHARGE INSTRUCTIONS
[facility]    Home Care Following Endoscopy          Activity:  You have just undergone an endoscopic procedure performed with Monitored Anesthesia Care (MAC).    Do not work or operate machinery (including a car) OR drink alcohol (including beer) OR sign legal papers for at least 12 hours.    I encourage you to walk and attempt to pass this air as soon as possible.    Diet:  Return to the diet you were on before your procedure but eat lightly for the first 12-24 hours.  Drink plenty of water.  Resume any regular medications unless otherwise advised by your physician.     Pain:  You may take Tylenol or Ibuprofen as needed for pain.  Expected Recovery:  You can expect some mild abdominal fullness and/or discomfort due to the air used to inflate your intestinal tract.     Call Your Physician if You Have:    After Colonoscopy:  Worsening persisting abdominal pain which is worse with activity.  Fevers (>101 degrees F), chills or shakes.  Passage of continued blood with bowel movements.     Any questions or concerns about your recovery, please call [phone] or after hours [phone] ([phone]) Nurse Advice Line.    Follow-up Care:       You should follow up with your Primary provider as needed.

## 2023-11-07 NOTE — ANESTHESIA PREPROCEDURE EVALUATION
Anesthesia Pre-Procedure Evaluation    Patient: Nilam Orozco   MRN: [id number] : 1975        Procedure : Procedure(s):  Colonoscopy          Past Medical History:   Diagnosis Date     Lipoma     multiple on legs      Past Surgical History:   Procedure Laterality Date     EXCISE MASS LOWER EXTREMITY Bilateral 2022    Procedure: excision multiple lipomas of the lower extremities;  Surgeon: Ulises Cm DO;  [facility]     EXCISE MASS UPPER EXTREMITY Right 2022    Procedure: excision multiple lipomas of the upper extremities;  Surgeon: Ulises Cm DO;  [facility]     SURGICAL PATHOLOGY EXAM      multiple      Allergies   Allergen Reactions     Codeine Hives      Social History     Tobacco Use     Smoking status: Former     Types: Cigarettes     Smokeless tobacco: Never   Substance Use Topics     Alcohol use: Yes     Alcohol/week: 0.0 standard drinks of alcohol      Wt Readings from Last 1 Encounters:   10/18/23 75.8 kg (167 lb)        Anesthesia Evaluation   Pt has had prior anesthetic. Type: MAC and General.    No history of anesthetic complications       ROS/MED HX  ENT/Pulmonary:     (+)                tobacco use, Past use,                      Neurologic:  - neg neurologic ROS     Cardiovascular:  - neg cardiovascular ROS   (+)  - -   -  - -                                 Previous cardiac testing   Echo: Date: Results:    Stress Test:  Date: Results:    ECG Reviewed:  Date: 1/3/22 Results:  SR  Cath:  Date: Results:      METS/Exercise Tolerance:     Hematologic:  - neg hematologic  ROS     Musculoskeletal: Comment: Shoulder pain left      GI/Hepatic:  - neg GI/hepatic ROS     Renal/Genitourinary:  - neg Renal ROS     Endo:  - neg endo ROS     Psychiatric/Substance Use:  - neg psychiatric ROS     Infectious Disease:  - neg infectious disease ROS     Malignancy:  - neg malignancy ROS     Other:  - neg other ROS    (+)  , H/O Chronic Pain,       Physical  "Exam    Airway  airway exam normal      Mallampati: II   TM distance: > 3 FB   Neck ROM: full   Mouth opening: > 3 cm    Respiratory Devices and Support         Dental       (+) Minor Abnormalities - some fillings, tiny chips      Cardiovascular   cardiovascular exam normal       Rhythm and rate: regular and normal     Pulmonary   pulmonary exam normal        breath sounds clear to auscultation       OUTSIDE LABS:  CBC:   Lab Results   Component Value Date    WBC 4.1 09/19/2023    WBC 6.4 01/03/2022    HGB 13.6 09/19/2023    HGB 13.2 01/03/2022    HCT 39.6 09/19/2023    HCT 38.8 01/03/2022     09/19/2023     01/03/2022     BMP:   Lab Results   Component Value Date     09/19/2023     01/03/2022    POTASSIUM 4.2 09/19/2023    POTASSIUM 4.0 01/03/2022    CHLORIDE 102 09/19/2023    CHLORIDE 109 01/03/2022    CO2 26 09/19/2023    CO2 26 01/03/2022    BUN 14.5 09/19/2023    BUN 16 01/03/2022    CR 0.83 09/19/2023    CR 0.64 01/03/2022     (H) 09/19/2023    GLC 94 01/03/2022     COAGS: No results found for: \"PTT\", \"INR\", \"FIBR\"  POC: No results found for: \"BGM\", \"HCG\", \"HCGS\"  HEPATIC:   Lab Results   Component Value Date    ALBUMIN 4.8 09/19/2023    PROTTOTAL 7.3 09/19/2023    ALT 25 09/19/2023    AST 20 09/19/2023    ALKPHOS 65 09/19/2023    BILITOTAL 0.4 09/19/2023     OTHER:   Lab Results   Component Value Date    YAN 9.9 09/19/2023    TSH 1.31 01/03/2022    SED 7 08/06/2014       Anesthesia Plan    ASA Status:  2    NPO Status:  NPO Appropriate    Anesthesia Type: MAC.     - Reason for MAC: straight local not clinically adequate      Maintenance: TIVA.        Consents    Anesthesia Plan(s) and associated risks, benefits, and realistic alternatives discussed. Questions answered and patient/representative(s) expressed understanding.     - Discussed:     - Discussed with:  Patient       Use of blood products discussed: No .     Postoperative Care            Comments:    Other Comments: " The risks and benefits of anesthesia, and the alternatives where applicable, have been discussed with the patient, and they wish to proceed.          WINDY Grey CRNA

## 2023-11-07 NOTE — ANESTHESIA POSTPROCEDURE EVALUATION
Patient: Nilam Orozco    Procedure: Procedure(s):  Colonoscopy       Anesthesia Type:  MAC    Note:  Disposition: Outpatient   Postop Pain Control: Uneventful            Sign Out: Well controlled pain   PONV: No   Neuro/Psych: Uneventful            Sign Out: Acceptable/Baseline neuro status   Airway/Respiratory: Uneventful            Sign Out: Acceptable/Baseline resp. status   CV/Hemodynamics: Uneventful            Sign Out: Acceptable CV status   Other NRE: NONE   DID A NON-ROUTINE EVENT OCCUR? No    Event details/Postop Comments:  Pt was happy with anesthesia care.  No complications.  I will follow up with the pt if needed.           Last vitals:  Vitals Value Taken Time   /83 11/07/23 0950   Temp     Pulse 57 11/07/23 0950   Resp 16 11/07/23 0950   SpO2 100 % 11/07/23 0954   Vitals shown include unfiled device data.    Electronically Signed By: WINDY Grey CRNA  November 7, 2023  11:00 AM

## 2023-11-07 NOTE — ANESTHESIA CARE TRANSFER NOTE
Patient: Nilam Orozco    Procedure: Procedure(s):  Colonoscopy       Diagnosis: Colon cancer screening [Z12.11]  Diagnosis Additional Information: No value filed.    Anesthesia Type:   MAC     Note:    Oropharynx: oropharynx clear of all foreign objects and spontaneously breathing  Level of Consciousness: drowsy  Oxygen Supplementation: face mask    Independent Airway: airway patency satisfactory and stable  Dentition: dentition unchanged  Vital Signs Stable: post-procedure vital signs reviewed and stable  Report to RN Given: handoff report given  Patient transferred to: PACU    Handoff Report: Identifed the Patient, Identified the Reponsible Provider, Reviewed the pertinent medical history, Discussed the surgical course, Reviewed Intra-OP anesthesia mangement and issues during anesthesia, Set expectations for post-procedure period and Allowed opportunity for questions and acknowledgement of understanding  Vitals:  Vitals Value Taken Time   BP     Temp     Pulse     Resp     SpO2 99 % 11/07/23 0929   Vitals shown include unfiled device data.    Electronically Signed By: WINDY Grey CRNA  November 7, 2023  9:30 AM

## 2023-11-07 NOTE — H&P
[facility]    Pre-Endoscopy History and Physical     Nilam Orozco MRN# [id number]   YOB: 1975 Age: 48 year old     Date of Procedure: 11/7/2023  Primary care provider: Brenton Wooten  Type of Endoscopy: Colonoscopy with possible biopsy, possible polypectomy  Reason for Procedure: Screening  Type of Anesthesia Anticipated: General anesthesia     HPI:    Nilam is a 48 year old female who will be undergoing the above procedure.      A history and physical has been performed. The patient's medications and allergies have been reviewed. The risks and benefits of the procedure and the sedation options and risks were discussed with the patient.  All questions were answered and informed consent was obtained.      No previous colonoscopy. She denies a personal or family history of anesthesia complications or bleeding disorders. No family history of colon cancer. Not on blood thinners.     Patient Active Problem List   Diagnosis    Symptomatic menopausal or female climacteric states    Injury of left shoulder, initial encounter    Arthralgia of left acromioclavicular joint    Arthritis of left acromioclavicular joint        Past Medical History:   Diagnosis Date    Lipoma     multiple on legs        Past Surgical History:   Procedure Laterality Date    EXCISE MASS LOWER EXTREMITY Bilateral 1/28/2022    Procedure: excision multiple lipomas of the lower extremities;  Surgeon: Ulises Cm DO;  [facility]    EXCISE MASS UPPER EXTREMITY Right 1/28/2022    Procedure: excision multiple lipomas of the upper extremities;  Surgeon: Ulises Cm DO;  Location: [facility]    SURGICAL PATHOLOGY EXAM      multiple       Social History     Tobacco Use    Smoking status: Former     Types: Cigarettes    Smokeless tobacco: Never   Substance Use Topics    Alcohol use: Yes     Alcohol/week: 0.0 standard drinks of alcohol       Family History   Problem Relation Age of Onset  "   Coronary Artery Disease Mother     Aneurysm Mother     Other Cancer Maternal Grandmother         cervical    Mental Illness Paternal Grandfather         alzheimers    Cancer Nephew     Breast Cancer Paternal Aunt        Prior to Admission medications    Medication Sig Start Date End Date Taking? Authorizing Provider   tiZANidine (ZANAFLEX) 4 MG tablet TAKE 1/2 TO 2 TABLETS BY MOUTH AT BEDTIME AS NEEDED FOR MUSCLE SPASMS 5/17/23  Yes Reported, Patient   bisacodyl (DULCOLAX) 5 MG EC tablet 2 days prior to procedure, take 2 tablets at 4 pm. 1 day prior to procedure, take 2 tablets at 4 pm. For additional instructions refer to your colonoscopy prep instructions. 10/24/23   Juwan Burgess MD   polyethylene glycol (GOLYTELY) 236 g suspension 2 days prior at 5pm, mix and drink half of a jug of Golytely. Drink an 8 oz. glass of Golytely every 15 minutes until half of the jug is gone. Place remainder of Golytely in the refrigerator. 1 day prior at 5 pm, drink the 2nd half of a jug of Golytely bowel prep. 6 hours before your check-in time, drink an 8 oz. glass of Golytely every 15 minutes until half of the 2nd jug of Golytely is gone. Discard remainder of second jug. 10/24/23   Juwan Burgess MD       Allergies   Allergen Reactions    Codeine Hives        REVIEW OF SYSTEMS:   5 point ROS negative except as noted above in HPI, including Gen., Resp., CV, GI &  system review.    PHYSICAL EXAM:   /88   Pulse 85   Resp 16   LMP 06/01/2016   SpO2 98%  Estimated body mass index is 26.55 kg/m  as calculated from the following:    Height as of 10/18/23: 1.689 m (5' 6.5\").    Weight as of 10/18/23: 75.8 kg (167 lb).   Constitutional: Awake, alert, no acute distress.  Eyes: No scleral icterus.  Conjunctiva are without injection.  ENMT: Mucous membranes moist, dentition and gums are intact.   Neck: Soft, supple, trachea midline.    Endocrine: n/a   Lymphatic: There is no cervical, submandibularadenopathy.  Respiratory: " normal efforts   Cardiovascular: S1, S2  Abdomen: Non-distended, non-tender,  No masses,  Musculoskeletal: No spinal or CVA tenderness. Full range of motion in the upper and lower extremities.    Skin: No skin rashes or lesions to inspection.  No petechia.    Neurologic: alerted and oriented 3x  Psychiatric: The patient's affect is not blunted and mood is appropriate.  DIAGNOSTICS:    Not indicated    IMPRESSION   ASA Class 1 - Healthy patient, no medical problems    PLAN:   Plan for Colonoscopy with possible biopsy, possible polypectomy. We discussed the risks, benefits and alternatives and the patient wished to proceed.  Patient is cleared for the above procedure.    The above has been forwarded to the consulting provider.    [facility], [facility]

## 2023-11-30 ENCOUNTER — VIRTUAL VISIT (OUTPATIENT)
Dept: CONSULT | Facility: CLINIC | Age: 48
End: 2023-11-30
Attending: INTERNAL MEDICINE
Payer: COMMERCIAL

## 2023-11-30 VITALS — WEIGHT: 168 LBS | HEIGHT: 67 IN | BODY MASS INDEX: 26.37 KG/M2

## 2023-11-30 DIAGNOSIS — Z13.71 ENCOUNTER FOR NONPROCREATIVE GENETIC COUNSELING AND TESTING: Primary | ICD-10-CM

## 2023-11-30 DIAGNOSIS — Z71.83 ENCOUNTER FOR NONPROCREATIVE GENETIC COUNSELING AND TESTING: Primary | ICD-10-CM

## 2023-11-30 DIAGNOSIS — Z83.49 FAMILY HISTORY OF HEMOCHROMATOSIS: ICD-10-CM

## 2023-11-30 PROCEDURE — 96040 HC GENETIC COUNSELING, EACH 30 MINUTES: CPT | Mod: GT,95

## 2023-11-30 ASSESSMENT — PAIN SCALES - GENERAL: PAINLEVEL: NO PAIN (0)

## 2023-11-30 NOTE — LETTER
"2023      RE: Nilam Orozco  [address] [facility] [address]     Dear Colleague,    Thank you for the opportunity to participate in the care of your patient, Nilam Orozco, at the [facility] at [facility]. Please see a copy of my visit note below.    GENETIC COUNSELING CONSULTATION     Name:  Nilam Orozco \"Nilam\"  :   1975  MRN:   [id number]  Date of service: 2023  Primary Provider: Brenton Wooten  Referring Provider: Chaya Stroud    Presenting Information:  Nilam Orozco is a 48 year old female presenting to genetic counseling via video visit due to a family history of hemochromatosis. She was unaccompanied to today's visit. I met with Nilam at the request of Dr. Chaya Stroud to review the genetics, natural history and inheritance of hereditary hemochromatosis and discuss the option of genetic testing.     HPI:  Please see Dr. Chaya Stroud's referral for a detailed personal history.   Patient Active Problem List   Diagnosis    Symptomatic menopausal or female climacteric states    Injury of left shoulder, initial encounter    Arthralgia of left acromioclavicular joint    Arthritis of left acromioclavicular joint      Past Medical History:   Diagnosis Date    Lipoma     multiple on legs     Relevant Labs      Previous Genetic Testing  None    Family History:   Detailed family history deferred due to indication and assessed needs.  Children:  Nilam has a son and 2 daughters, all well.  Siblings:  Nilam has 2 sisters with hereditary hemochromatosis and a niece with hereditary hemochromatosis.    Discussion:  We reviewed that hereditary hemochromatosis is a disorder that causes the body to absorb too much iron from the diet. The excess iron is stored in the body's tissues and organs, particularly the skin, heart, liver, pancreas, and joints. Hereditary hemochromatosis can be the result of many " different genetic causes.  The most common genetic cause of hereditary hemochromatosis is due to variants in the HFE gene and this form of the condition is referred to as type 1.  Our discussion centered around hereditary hemochromatosis due to variants in HFE due to the family history information reported and due to the more common nature of this form of hemochromatosis (it is one of the most common genetic disorders in the [place], affecting about 1 million people, mostly those of [place]  descent).    Early symptoms of hereditary hemochromatosis may include extreme tiredness (fatigue), joint pain, abdominal pain, weight loss, and loss of sex drive. As the condition worsens, affected individuals may develop arthritis, liver disease (cirrhosis) or liver cancer, diabetes, heart abnormalities, or skin discoloration. The appearance and severity of symptoms can be affected by environmental and lifestyle factors such as the amount of iron in the diet, alcohol use, and infections.  Symptom of hemochromatosis typically begin in adulthood. Not every individual who carries the genetic cause of hemochromatosis will develop symptoms of the condition.  Males are more commonly affected than women.     Management of hemochromatosis include regular phlebotomy when serum ferritin concentrations are elevated.  Individuals with hemochromatosis should have vaccination against hepatitis A and B and avoid medicinal iron, mineral supplements, excess vitamin C, uncooked seafood, and alcohol consumption.     Diagnosis of hemochromatosis requires confirmation of increased serum ferritin levels and transferrin saturation, with or without symptoms.  Genetic testing can help to determine the cause of hemochromatosis.  Serum ferritin measurement is the most useful prognostic indicator of disease severity. Liver biopsy is performed to stage the degree of fibrosis with severe ferritin elevation or transaminitis, or to diagnose  nonclassical hereditary hemochromatosis in patients with other genetic defects.     Inheritance  Our bodies are made up of millions of cells. Within each of those cells are structures called chromosomes. Our chromosomes contain our genes. Genes can be thought of as the instruction manual for our bodies. They are made up of long strings of base pairs which are abbreviated as A s, T s, C s, and G s. We have 2 copies of nearly every gene; we inherit 1 copy from our mother and 1 copy from our father. Sometimes a change or variant can occur in the letters which make up the gene that interrupts the instruction of the gene. Depending on the gene and the variant, this can be associated with different health problems. The most common gene associated with hemochromatosis is the HFE gene.    Hemochromatosis is a recessive genetic condition.  This means that both copies of the gene (i.e. the copy that we inherit from our father and the copy that we inherit from our mother) must have a disease-causing variant for an individual to be affected.  The most common genetic cause of hemochromatosis is referred to as C282Y.  Around 1/9 (11%) of individuals with  ancestry are carriers for the common C282Y mutation, and around 1/4 individuals of  ancestry are carriers for the common H63D mutation. A carrier is an individual who has one working copy of the HFE gene without a variant and one non-working copy of the HFE gene that contains a variant. Carriers are not expected to have symptoms of hemochromatosis.    The Iron Disorders [place] and American Association for the Study of Liver Diseases recommend targeted screening. All first-degree relatives of persons with hereditary hemochromatosis should be screened. Children who have one parent with hereditary hemochromatosis should not undergo genetic testing until after the other parent is tested. If the other parent is normal (i.e., absence of C282Y, S65C, or H63D gene  defects), all children will be simple heterozygous and will not have an increased risk of iron overload.    We discussed that Nilam has a 25% or 1 in 4 chance of having inherited the same hemochromatosis variants as her sisters. We also discussed the option of monitoring ferritin levels rather than pursuing genetic testing. Nilam expressed that she would prefer to monitor her ferritin levels rather than pursue genetic testing. She is aware that this testing is available at any time if she changes her mind.     We discussed that given the relatively high carrier frequency for hemochromatosis, that it is not uncommon for multiple generations to be affected in the same family even though the inheritance pattern is autosomal recessive (which is typically not seen with multiple affected generations). We discussed that her children could pursue their own genetic testing or could opt to monitor their ferritin levels. I am happy to assist in their genetic testing if they wish.     Plan  1. Genetics, inheritance and natural history of hereditary hemochromatosis reviewed  2. Nilam opted to monitor her ferritin levels through her PCP and NOT pursue genetic testing at this time. If she changes her mind, she can reach out at any time.  3. If her children/partner wish to pursue their own genetic testing, I am happy to assist.  4. Additional recommendations per Chaya Stroud     Please do not hesitate to reach out with any questions or concerns. It was a pleasure to participate in Nilam's care today.       Mary Tello MS, [facility]  Genetic Counseling  [facility]  Pager: [phone]  Phone: [phone]  Fax: [phone]       Approximate Time Spent in Consultation: 30 min    References:  https://medlineplus.gov/genetics/condition/hereditary-hemochromatosis/#references  Bruce JHONNY, Denzel CQ. HFE Hemochromatosis. 2000 Apr 3 [Updated 2018 Dec 6]. In: David MP, Nikki HH, Breezy RA, et al., editors.  "True  [Internet]. [place] ([place]): [facility]; 4645-5313. Available from: https://www.ncbi.nlm.nih.gov/books/DGB3071/  Umer Reis, and Yuri Rosa. \"Hereditary hemochromatosis.\" American family physician 87.3 (2013): 183-190.    Virtual Visit Details    Type of service:  Video Visit   Originating Location (pt. Location): Home  Distant Location (provider location):  On-site  Platform used for Video Visit: Terrence             Please do not hesitate to contact me if you have any questions/concerns.     Sincerely,       Mary Tello GC  "

## 2023-11-30 NOTE — NURSING NOTE
Is the patient currently in the state of [place]? YES    Visit mode:VIDEO    If the visit is dropped, the patient can be reconnected by: VIDEO VISIT: Text to cell phone:   Telephone Information:   Mobile [phone]       Will anyone else be joining the visit? NO  (If patient encounters technical issues they should call [phone]    How would you like to obtain your AVS? MyChart    Are changes needed to the allergy or medication list? No    Reason for visit: Consult    Naomi ALBRECHT

## 2023-11-30 NOTE — PROGRESS NOTES
"GENETIC COUNSELING CONSULTATION     Name:  Nilam Orozco \"Nilam\"  :   1975  MRN:   [id number]  Date of service: 2023  Primary Provider: Brenton Wooten  Referring Provider: Chaya Stroud    Presenting Information:  Nilam Orozco is a 48 year old female presenting to genetic counseling via video visit due to a family history of hemochromatosis. She was unaccompanied to today's visit. I met with Nilam at the request of Dr. Chaya Stroud to review the genetics, natural history and inheritance of hereditary hemochromatosis and discuss the option of genetic testing.     HPI:  Please see Dr. Chaya Stroud's referral for a detailed personal history.   Patient Active Problem List   Diagnosis    Symptomatic menopausal or female climacteric states    Injury of left shoulder, initial encounter    Arthralgia of left acromioclavicular joint    Arthritis of left acromioclavicular joint      Past Medical History:   Diagnosis Date    Lipoma     multiple on legs     Relevant Labs      Previous Genetic Testing  None    Family History:   Detailed family history deferred due to indication and assessed needs.  Children:  Nilam has a son and 2 daughters, all well.  Siblings:  Nilam has 2 sisters with hereditary hemochromatosis and a niece with hereditary hemochromatosis.    Discussion:  We reviewed that hereditary hemochromatosis is a disorder that causes the body to absorb too much iron from the diet. The excess iron is stored in the body's tissues and organs, particularly the skin, heart, liver, pancreas, and joints. Hereditary hemochromatosis can be the result of many different genetic causes.  The most common genetic cause of hereditary hemochromatosis is due to variants in the HFE gene and this form of the condition is referred to as type 1.  Our discussion centered around hereditary hemochromatosis due to variants in HFE due to the family history information reported and due to the more common nature of this form " of hemochromatosis (it is one of the most common genetic disorders in the [place], affecting about 1 million people, mostly those of [place]  descent).    Early symptoms of hereditary hemochromatosis may include extreme tiredness (fatigue), joint pain, abdominal pain, weight loss, and loss of sex drive. As the condition worsens, affected individuals may develop arthritis, liver disease (cirrhosis) or liver cancer, diabetes, heart abnormalities, or skin discoloration. The appearance and severity of symptoms can be affected by environmental and lifestyle factors such as the amount of iron in the diet, alcohol use, and infections.  Symptom of hemochromatosis typically begin in adulthood. Not every individual who carries the genetic cause of hemochromatosis will develop symptoms of the condition.  Males are more commonly affected than women.     Management of hemochromatosis include regular phlebotomy when serum ferritin concentrations are elevated.  Individuals with hemochromatosis should have vaccination against hepatitis A and B and avoid medicinal iron, mineral supplements, excess vitamin C, uncooked seafood, and alcohol consumption.     Diagnosis of hemochromatosis requires confirmation of increased serum ferritin levels and transferrin saturation, with or without symptoms.  Genetic testing can help to determine the cause of hemochromatosis.  Serum ferritin measurement is the most useful prognostic indicator of disease severity. Liver biopsy is performed to stage the degree of fibrosis with severe ferritin elevation or transaminitis, or to diagnose nonclassical hereditary hemochromatosis in patients with other genetic defects.     Inheritance  Our bodies are made up of millions of cells. Within each of those cells are structures called chromosomes. Our chromosomes contain our genes. Genes can be thought of as the instruction manual for our bodies. They are made up of long strings of base pairs which  are abbreviated as A s, T s, C s, and G s. We have 2 copies of nearly every gene; we inherit 1 copy from our mother and 1 copy from our father. Sometimes a change or variant can occur in the letters which make up the gene that interrupts the instruction of the gene. Depending on the gene and the variant, this can be associated with different health problems. The most common gene associated with hemochromatosis is the HFE gene.    Hemochromatosis is a recessive genetic condition.  This means that both copies of the gene (i.e. the copy that we inherit from our father and the copy that we inherit from our mother) must have a disease-causing variant for an individual to be affected.  The most common genetic cause of hemochromatosis is referred to as C282Y.  Around 1/9 (11%) of individuals with  ancestry are carriers for the common C282Y mutation, and around 1/4 individuals of  ancestry are carriers for the common H63D mutation. A carrier is an individual who has one working copy of the HFE gene without a variant and one non-working copy of the HFE gene that contains a variant. Carriers are not expected to have symptoms of hemochromatosis.    The Iron Disorders [place] and American Association for the Study of Liver Diseases recommend targeted screening. All first-degree relatives of persons with hereditary hemochromatosis should be screened. Children who have one parent with hereditary hemochromatosis should not undergo genetic testing until after the other parent is tested. If the other parent is normal (i.e., absence of C282Y, S65C, or H63D gene defects), all children will be simple heterozygous and will not have an increased risk of iron overload.    We discussed that Nilam has a 25% or 1 in 4 chance of having inherited the same hemochromatosis variants as her sisters. We also discussed the option of monitoring ferritin levels rather than pursuing genetic testing. Nilam expressed that she would prefer  "to monitor her ferritin levels rather than pursue genetic testing. She is aware that this testing is available at any time if she changes her mind.     We discussed that given the relatively high carrier frequency for hemochromatosis, that it is not uncommon for multiple generations to be affected in the same family even though the inheritance pattern is autosomal recessive (which is typically not seen with multiple affected generations). We discussed that her children could pursue their own genetic testing or could opt to monitor their ferritin levels. I am happy to assist in their genetic testing if they wish.     Plan  1. Genetics, inheritance and natural history of hereditary hemochromatosis reviewed  2. Nilam opted to monitor her ferritin levels through her PCP and NOT pursue genetic testing at this time. If she changes her mind, she can reach out at any time.  3. If her children/partner wish to pursue their own genetic testing, I am happy to assist.  4. Additional recommendations per Chaya Stroud     Please do not hesitate to reach out with any questions or concerns. It was a pleasure to participate in Nilam's care today.       Mary Tello MS, [facility]  Genetic Counseling  [facility]  Pager: [phone]  Phone: [phone]  Fax: [phone]       Approximate Time Spent in Consultation: 30 min    References:  https://medlineplus.gov/genetics/condition/hereditary-hemochromatosis/#references  Bruce JHONNY, Denzel CQ. HFE Hemochromatosis. 2000 Apr 3 [Updated 2018 Dec 6]. In: David MP, Nikki HH, Breezy RA, et al., editors. AngelaSLID  [Internet]. [place] ([place]): [facility]; 8671-0770. Available from: https://www.ncbi.nlm.nih.gov/books/ZBP1056/  Umer Reis, and Yuri Rosa. \"Hereditary hemochromatosis.\" American family physician 87.3 (2013): 183-190.    Virtual Visit Details    Type of service:  Video Visit   Originating Location (pt. " Location): Home  Distant Location (provider location):  On-site  Platform used for Video Visit: Terrence

## 2023-12-05 NOTE — PATIENT INSTRUCTIONS
Plan  1. Genetics, inheritance and natural history of hereditary hemochromatosis reviewed  2. Nilam opted to monitor her ferritin levels through her PCP and NOT pursue genetic testing at this time. If she changes her mind, she can reach out at any time.  3. If her children/partner wish to pursue their own genetic testing, I am happy to assist.  4. Additional recommendations per Chaya Stroud     Please do not hesitate to reach out with any questions or concerns. It was a pleasure to participate in Nilam's care today.       Mary Tello MS, [facility]  Genetic Counseling  [phone]

## 2024-11-10 ENCOUNTER — HEALTH MAINTENANCE LETTER (OUTPATIENT)
Age: 49
End: 2024-11-10

## 2025-07-12 ENCOUNTER — HEALTH MAINTENANCE LETTER (OUTPATIENT)
Age: 50
End: 2025-07-12

## (undated) DEVICE — GLOVE EXAM NITRILE LG

## (undated) DEVICE — SU VICRYL 3-0 SH 27" UND J416H

## (undated) DEVICE — BLADE KNIFE SURG 15 371115

## (undated) DEVICE — GLOVE PROTEXIS W/NEU-THERA 7.5  2D73TE75

## (undated) DEVICE — SOL WATER IRRIG 1000ML BOTTLE 07139-09

## (undated) DEVICE — DRSG STERI STRIP 1/2X4" R1547

## (undated) DEVICE — PREP CHLORAPREP 26ML TINTED ORANGE  260815

## (undated) DEVICE — TUBING SUCTION 12"X1/4" N612

## (undated) DEVICE — BLADE KNIFE SURG 10 371110

## (undated) DEVICE — LUBRICATING JELLY 4.25OZ

## (undated) DEVICE — SOL ADH LIQUID BENZOIN SWAB 0.6ML C1544

## (undated) DEVICE — SUCTION MANIFOLD NEPTUNE 2 SYS 4 PORT 0702-020-000

## (undated) DEVICE — KIT ENDO TURNOVER/PROCEDURE CARRY-ON 101822

## (undated) DEVICE — BASIN SET MINOR DISP

## (undated) DEVICE — GLOVE PROTEXIS BLUE W/NEU-THERA 8.0  2D73EB80

## (undated) RX ORDER — LIDOCAINE HYDROCHLORIDE 10 MG/ML
INJECTION, SOLUTION INFILTRATION; PERINEURAL
Status: DISPENSED
Start: 2022-01-28